# Patient Record
Sex: MALE | Race: WHITE | NOT HISPANIC OR LATINO | Employment: OTHER | URBAN - METROPOLITAN AREA
[De-identification: names, ages, dates, MRNs, and addresses within clinical notes are randomized per-mention and may not be internally consistent; named-entity substitution may affect disease eponyms.]

---

## 2019-11-01 ENCOUNTER — APPOINTMENT (OUTPATIENT)
Dept: CT IMAGING | Facility: CLINIC | Age: 84
End: 2019-11-01
Attending: STUDENT IN AN ORGANIZED HEALTH CARE EDUCATION/TRAINING PROGRAM
Payer: MEDICARE

## 2019-11-01 ENCOUNTER — HOSPITAL ENCOUNTER (OUTPATIENT)
Facility: CLINIC | Age: 84
Setting detail: OBSERVATION
Discharge: HOME | End: 2019-11-02
Attending: STUDENT IN AN ORGANIZED HEALTH CARE EDUCATION/TRAINING PROGRAM | Admitting: INTERNAL MEDICINE
Payer: MEDICARE

## 2019-11-01 ENCOUNTER — APPOINTMENT (OUTPATIENT)
Dept: MRI IMAGING | Facility: CLINIC | Age: 84
End: 2019-11-01
Attending: STUDENT IN AN ORGANIZED HEALTH CARE EDUCATION/TRAINING PROGRAM
Payer: MEDICARE

## 2019-11-01 ENCOUNTER — APPOINTMENT (OUTPATIENT)
Dept: GENERAL RADIOLOGY | Facility: CLINIC | Age: 84
End: 2019-11-01
Attending: STUDENT IN AN ORGANIZED HEALTH CARE EDUCATION/TRAINING PROGRAM
Payer: MEDICARE

## 2019-11-01 DIAGNOSIS — M54.2 NECK PAIN: ICD-10-CM

## 2019-11-01 DIAGNOSIS — Z79.01 CHRONIC ANTICOAGULATION: ICD-10-CM
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
## 2019-11-01 DIAGNOSIS — R47.9 DIFFICULTY WITH SPEECH: ICD-10-CM

## 2019-11-01 DIAGNOSIS — R79.89 ELEVATED TROPONIN: ICD-10-CM

## 2019-11-01 DIAGNOSIS — R45.89 FEELING OF SADNESS: ICD-10-CM

## 2019-11-01 PROBLEM — E78.5 HYPERLIPIDEMIA: Status: ACTIVE | Noted: 2019-11-01

## 2019-11-01 PROBLEM — D69.6 THROMBOCYTOPENIA (H): Status: ACTIVE | Noted: 2018-03-02

## 2019-11-01 PROBLEM — I82.412 ACUTE DEEP VEIN THROMBOSIS (DVT) OF FEMORAL VEIN OF LEFT LOWER EXTREMITY (H): Status: ACTIVE | Noted: 2019-06-18

## 2019-11-01 PROBLEM — C61 PROSTATE CANCER (H): Status: ACTIVE | Noted: 2018-03-02

## 2019-11-01 PROBLEM — I10 ESSENTIAL HYPERTENSION: Status: ACTIVE | Noted: 2019-11-01

## 2019-11-01 PROBLEM — M75.102 ROTATOR CUFF TEAR, NON-TRAUMATIC, LEFT: Status: ACTIVE | Noted: 2018-03-02

## 2019-11-01 PROBLEM — F32.0 MILD MAJOR DEPRESSION (H): Status: ACTIVE | Noted: 2019-09-30

## 2019-11-01 PROBLEM — D09.8 CARCINOMA IN SITU OF OTHER SPECIFIED SITES: Status: ACTIVE | Noted: 2019-11-01

## 2019-11-01 LAB
ALBUMIN SERPL-MCNC: 3.3 G/DL (ref 3.4–5)
ALP SERPL-CCNC: 60 U/L (ref 40–150)
ALT SERPL W P-5'-P-CCNC: 20 U/L (ref 0–70)
ANION GAP SERPL CALCULATED.3IONS-SCNC: 9 MMOL/L (ref 3–14)
APTT PPP: 41 SEC (ref 22–37)
AST SERPL W P-5'-P-CCNC: 30 U/L (ref 0–45)
BASOPHILS # BLD AUTO: 0 10E9/L (ref 0–0.2)
BASOPHILS NFR BLD AUTO: 0.7 %
BILIRUB SERPL-MCNC: 0.4 MG/DL (ref 0.2–1.3)
BUN SERPL-MCNC: 17 MG/DL (ref 7–30)
CALCIUM SERPL-MCNC: 8.1 MG/DL (ref 8.5–10.1)
CHLORIDE SERPL-SCNC: 111 MMOL/L (ref 94–109)
CO2 SERPL-SCNC: 24 MMOL/L (ref 20–32)
CREAT SERPL-MCNC: 1.04 MG/DL (ref 0.66–1.25)
DIFFERENTIAL METHOD BLD: ABNORMAL
EOSINOPHIL # BLD AUTO: 0.1 10E9/L (ref 0–0.7)
EOSINOPHIL NFR BLD AUTO: 1.7 %
ERYTHROCYTE [DISTWIDTH] IN BLOOD BY AUTOMATED COUNT: 12.5 % (ref 10–15)
GFR SERPL CREATININE-BSD FRML MDRD: 64 ML/MIN/{1.73_M2}
GLUCOSE BLDC GLUCOMTR-MCNC: 124 MG/DL (ref 70–99)
GLUCOSE SERPL-MCNC: 129 MG/DL (ref 70–99)
HCT VFR BLD AUTO: 41.4 % (ref 40–53)
HGB BLD-MCNC: 13.5 G/DL (ref 13.3–17.7)
IMM GRANULOCYTES # BLD: 0 10E9/L (ref 0–0.4)
IMM GRANULOCYTES NFR BLD: 0.6 %
INR PPP: 2.51 (ref 0.86–1.14)
LYMPHOCYTES # BLD AUTO: 1 10E9/L (ref 0.8–5.3)
LYMPHOCYTES NFR BLD AUTO: 17.8 %
MCH RBC QN AUTO: 30.6 PG (ref 26.5–33)
MCHC RBC AUTO-ENTMCNC: 32.6 G/DL (ref 31.5–36.5)
MCV RBC AUTO: 94 FL (ref 78–100)
MONOCYTES # BLD AUTO: 0.4 10E9/L (ref 0–1.3)
MONOCYTES NFR BLD AUTO: 7.9 %
NEUTROPHILS # BLD AUTO: 3.9 10E9/L (ref 1.6–8.3)
NEUTROPHILS NFR BLD AUTO: 71.3 %
NRBC # BLD AUTO: 0 10*3/UL
NRBC BLD AUTO-RTO: 0 /100
PLATELET # BLD AUTO: 144 10E9/L (ref 150–450)
POTASSIUM SERPL-SCNC: 3.6 MMOL/L (ref 3.4–5.3)
PROT SERPL-MCNC: 6.8 G/DL (ref 6.8–8.8)
RBC # BLD AUTO: 4.41 10E12/L (ref 4.4–5.9)
SODIUM SERPL-SCNC: 144 MMOL/L (ref 133–144)
TROPONIN I SERPL-MCNC: 0.05 UG/L (ref 0–0.04)
WBC # BLD AUTO: 5.4 10E9/L (ref 4–11)

## 2019-11-01 PROCEDURE — 25000132 ZZH RX MED GY IP 250 OP 250 PS 637: Mod: GY | Performed by: STUDENT IN AN ORGANIZED HEALTH CARE EDUCATION/TRAINING PROGRAM

## 2019-11-01 PROCEDURE — 70498 CT ANGIOGRAPHY NECK: CPT

## 2019-11-01 PROCEDURE — 25000132 ZZH RX MED GY IP 250 OP 250 PS 637: Mod: GY | Performed by: PHYSICIAN ASSISTANT

## 2019-11-01 PROCEDURE — 25000125 ZZHC RX 250: Performed by: STUDENT IN AN ORGANIZED HEALTH CARE EDUCATION/TRAINING PROGRAM

## 2019-11-01 PROCEDURE — A9585 GADOBUTROL INJECTION: HCPCS | Performed by: STUDENT IN AN ORGANIZED HEALTH CARE EDUCATION/TRAINING PROGRAM

## 2019-11-01 PROCEDURE — 70450 CT HEAD/BRAIN W/O DYE: CPT

## 2019-11-01 PROCEDURE — 25500064 ZZH RX 255 OP 636: Performed by: STUDENT IN AN ORGANIZED HEALTH CARE EDUCATION/TRAINING PROGRAM

## 2019-11-01 PROCEDURE — 93010 ELECTROCARDIOGRAM REPORT: CPT | Mod: Z6 | Performed by: STUDENT IN AN ORGANIZED HEALTH CARE EDUCATION/TRAINING PROGRAM

## 2019-11-01 PROCEDURE — 85025 COMPLETE CBC W/AUTO DIFF WBC: CPT | Performed by: STUDENT IN AN ORGANIZED HEALTH CARE EDUCATION/TRAINING PROGRAM

## 2019-11-01 PROCEDURE — 70553 MRI BRAIN STEM W/O & W/DYE: CPT

## 2019-11-01 PROCEDURE — 72125 CT NECK SPINE W/O DYE: CPT

## 2019-11-01 PROCEDURE — 00000146 ZZHCL STATISTIC GLUCOSE BY METER IP

## 2019-11-01 PROCEDURE — 84484 ASSAY OF TROPONIN QUANT: CPT | Performed by: STUDENT IN AN ORGANIZED HEALTH CARE EDUCATION/TRAINING PROGRAM

## 2019-11-01 PROCEDURE — 85610 PROTHROMBIN TIME: CPT | Performed by: STUDENT IN AN ORGANIZED HEALTH CARE EDUCATION/TRAINING PROGRAM

## 2019-11-01 PROCEDURE — 80053 COMPREHEN METABOLIC PANEL: CPT | Performed by: STUDENT IN AN ORGANIZED HEALTH CARE EDUCATION/TRAINING PROGRAM

## 2019-11-01 PROCEDURE — 99291 CRITICAL CARE FIRST HOUR: CPT | Mod: 25

## 2019-11-01 PROCEDURE — 71101 X-RAY EXAM UNILAT RIBS/CHEST: CPT | Mod: LT

## 2019-11-01 PROCEDURE — 25000128 H RX IP 250 OP 636: Performed by: STUDENT IN AN ORGANIZED HEALTH CARE EDUCATION/TRAINING PROGRAM

## 2019-11-01 PROCEDURE — 93005 ELECTROCARDIOGRAM TRACING: CPT

## 2019-11-01 PROCEDURE — 96360 HYDRATION IV INFUSION INIT: CPT

## 2019-11-01 PROCEDURE — G0378 HOSPITAL OBSERVATION PER HR: HCPCS

## 2019-11-01 PROCEDURE — 25800030 ZZH RX IP 258 OP 636: Performed by: STUDENT IN AN ORGANIZED HEALTH CARE EDUCATION/TRAINING PROGRAM

## 2019-11-01 PROCEDURE — 85730 THROMBOPLASTIN TIME PARTIAL: CPT | Performed by: STUDENT IN AN ORGANIZED HEALTH CARE EDUCATION/TRAINING PROGRAM

## 2019-11-01 PROCEDURE — 99285 EMERGENCY DEPT VISIT HI MDM: CPT | Mod: 25 | Performed by: STUDENT IN AN ORGANIZED HEALTH CARE EDUCATION/TRAINING PROGRAM

## 2019-11-01 RX ORDER — GADOBUTROL 604.72 MG/ML
7 INJECTION INTRAVENOUS ONCE
Status: COMPLETED | OUTPATIENT
Start: 2019-11-01 | End: 2019-11-01

## 2019-11-01 RX ORDER — ACETAMINOPHEN 325 MG/1
650 TABLET ORAL ONCE
Status: COMPLETED | OUTPATIENT
Start: 2019-11-01 | End: 2019-11-01

## 2019-11-01 RX ORDER — ALPRAZOLAM 0.25 MG
0.25 TABLET ORAL
Status: DISCONTINUED | OUTPATIENT
Start: 2019-11-01 | End: 2019-11-02 | Stop reason: HOSPADM

## 2019-11-01 RX ORDER — TRIAZOLAM 0.25 MG
0.25 TABLET ORAL
Status: DISCONTINUED | OUTPATIENT
Start: 2019-11-01 | End: 2019-11-01 | Stop reason: CLARIF

## 2019-11-01 RX ORDER — IBUPROFEN 400 MG/1
400 TABLET, FILM COATED ORAL
COMMUNITY
Start: 2018-05-29

## 2019-11-01 RX ORDER — IOPAMIDOL 755 MG/ML
70 INJECTION, SOLUTION INTRAVASCULAR ONCE
Status: COMPLETED | OUTPATIENT
Start: 2019-11-01 | End: 2019-11-01

## 2019-11-01 RX ORDER — ASPIRIN 325 MG
TABLET ORAL
COMMUNITY
Start: 2007-10-13

## 2019-11-01 RX ORDER — MIRTAZAPINE 15 MG/1
15 TABLET, FILM COATED ORAL EVERY EVENING
COMMUNITY
Start: 2019-09-30 | End: 2020-09-29

## 2019-11-01 RX ORDER — WARFARIN SODIUM 2.5 MG/1
TABLET ORAL
COMMUNITY
Start: 2019-10-18

## 2019-11-01 RX ORDER — TRIAZOLAM 0.25 MG
0.25 TABLET ORAL
COMMUNITY
Start: 2012-09-10

## 2019-11-01 RX ORDER — VARDENAFIL HYDROCHLORIDE 20 MG/1
20 TABLET ORAL
COMMUNITY
Start: 2012-06-18

## 2019-11-01 RX ADMIN — SODIUM CHLORIDE 500 ML: 9 INJECTION, SOLUTION INTRAVENOUS at 18:18

## 2019-11-01 RX ADMIN — ACETAMINOPHEN 650 MG: 325 TABLET, FILM COATED ORAL at 18:17

## 2019-11-01 RX ADMIN — IOPAMIDOL 70 ML: 755 INJECTION, SOLUTION INTRAVENOUS at 18:02

## 2019-11-01 RX ADMIN — ALPRAZOLAM 0.25 MG: 0.25 TABLET ORAL at 22:41

## 2019-11-01 RX ADMIN — GADOBUTROL 7 ML: 604.72 INJECTION INTRAVENOUS at 20:29

## 2019-11-01 RX ADMIN — SODIUM CHLORIDE 100 ML: 9 INJECTION, SOLUTION INTRAVENOUS at 18:02

## 2019-11-01 NOTE — ED PROVIDER NOTES
"  History     Chief Complaint   Patient presents with     Fall     Aphasia     HPI  Juan Miguel Hawkins is a 88 year old male with past medical history which includes insomnia, essential hypertension, hyperlipidemia, chronic anticoagulation since identifying left lower extremity DVT 6/18/2019 who presents with son for evaluation of difficulties with speaking earlier today.  The patient got out of bed last night around 11 PM to use the bathroom but tripped over a chair and at the left lateral aspect of his neck struck the edge of chair.  He is uncertain whether he lost consciousness, fall was not witnessed.  He eventually returned to bed and slept throughout the night without difficulties.  Ambulatory today feeling relatively well, however his son reports receiving a call from a concerned friend that while having lunch with the patient between 130-4:30 PM he was having speaking.  The patient describes the symptoms as her difficulties \"can spitting the words out\" but feels as though he was thinking clearly.  In the department he complains of some mild achy left lateral neck pain but denies headache, dizziness, visual changes from baseline,    Allergies:  Allergies   Allergen Reactions     Morphine Nausea and Vomiting     Other reaction(s): Gastrointestinal       Problem List:    Patient Active Problem List    Diagnosis Date Noted     Essential hypertension 11/01/2019     Priority: Medium     Hyperlipidemia 11/01/2019     Priority: Medium     Mild major depression (H) 09/30/2019     Priority: Medium     Acute deep vein thrombosis (DVT) of femoral vein of left lower extremity (H) 06/18/2019     Priority: Medium     Prostate cancer (H) 03/02/2018     Priority: Medium     Bx 2015 -low risk PROLARIS test, watchful waiting, elevated PSAs       Rotator cuff tear, non-traumatic, left 03/02/2018     Priority: Medium     TCO consult 11/2017 - conservative management       Thrombocytopenia (H) 03/02/2018     Priority: Medium     " Benign prostatic hyperplasia with lower urinary tract symptoms 02/13/2015     Priority: Medium     Increased prostate specific antigen (PSA) velocity 02/13/2015     Priority: Medium     Insomnia 02/13/2015     Priority: Medium     Personal history of colon cancer 02/13/2015     Priority: Medium        Past Medical History:    No past medical history on file.    Past Surgical History:    No past surgical history on file.    Family History:    No family history on file.    Social History:  Marital Status:    Social History     Tobacco Use     Smoking status: Not on file   Substance Use Topics     Alcohol use: Not on file     Drug use: Not on file        Medications:    aspirin (ASA) 325 MG tablet  ibuprofen (ADVIL/MOTRIN) 400 MG tablet  vardenafil (LEVITRA) 20 MG tablet  warfarin ANTICOAGULANT (COUMADIN) 2.5 MG tablet          Review of Systems  Constitutional:  Negative for fever or recent illness.  Eye:  Negative for deficits or changes from baseline.  Cardiovascular:  Negative for chest pain.  Respiratory:  Negative for cough or shortness of breath.  Gastrointestinal:  Negative for abdominal pain, nausea or vomiting.  Musculoskeletal:  Positive for left lateral neck discomfort.  Neurological: Positive for reported difficulty speaking and right-sided facial droop, resolved.  Negative for headache, dizziness, weakness or sensory deficits.    All others reviewed and are negative.      Physical Exam   BP: (!) 198/79  Pulse: 64  Heart Rate: 63  Temp: 97.6  F (36.4  C)  Resp: 20  Weight: 77.1 kg (170 lb)  SpO2: 98 %      Physical Exam  Constitutional:  Well developed, well nourished.  Appears nontoxic and in no acute distress.  Resting comfortably on the gurney.  HENT:  Normocephalic and atraumatic.  Symmetric in appearance.  Eyes:  Conjunctivae are normal.  Denies visual field deficit.  EOMI denies diplopia.  PERRLA.  Negative for nystagmus.  Neck:  Neck supple.  No step-offs or midline tenderness.  Cardiovascular:  No  cyanosis.  RRR.  No audible murmurs noted.   Respiratory:  Effort normal without sign of respiratory distress.  CTAB without diminished regions.    Gastrointestinal:  Soft nondistended abdomen.  Nontender and without guarding.  No rigidity or rebound tenderness.    Musculoskeletal:  Moves extremities spontaneously.  Neurological:  Patient is alert and oriented to place/time.  Speaking clearly without dysarthria or a aphasia.  Skin:  Skin is warm and dry.  Psychiatric:  Normal mood and affect.    [employer] Stroke Scale  Exam Interval: Baseline   Score    Level of consciousness: (0)   Alert, keenly responsive    LOC questions: (0)   Answers both questions correctly    LOC commands: (0)   Performs both tasks correctly    Best gaze: (0)   Normal    Visual: (0)   No visual loss    Facial palsy: (0)   Normal symmetrical movements    Motor arm (left): (0)   No drift    Motor arm (right): (0)   No drift    Motor leg (left): (0)   No drift    Motor leg (right): (0)   No drift    Limb ataxia: (0)   Absent    Sensory: (0)   Normal- no sensory loss    Best language: (0)   Normal- no aphasia    Dysarthria: (0)   Normal    Extinction and inattention: (0)   No abnormality        Total Score:  0       ED Course        Procedures               Critical Care time:  none         EKG Interpretation:      Interpreted by: Genaro Grier  Time reviewed: Upon completion    Symptoms at time of EKG: Asymptomatic   Rhythm: Sinus  Rate: Normal  Axis: Normal    Conduction: None atypical   ST Segments/ T Waves: No pathologic ST-elevations or T-wave abnormalities.  Q Waves: None  Comparison to prior: Similar morphology to previous     Clinical Impression: No sign of ischemia or arrhythmia              Results for orders placed or performed during the hospital encounter of 11/01/19 (from the past 24 hour(s))   Glucose by meter   Result Value Ref Range    Glucose 124 (H) 70 - 99 mg/dL   CBC with platelets differential   Result  Value Ref Range    WBC 5.4 4.0 - 11.0 10e9/L    RBC Count 4.41 4.4 - 5.9 10e12/L    Hemoglobin 13.5 13.3 - 17.7 g/dL    Hematocrit 41.4 40.0 - 53.0 %    MCV 94 78 - 100 fl    MCH 30.6 26.5 - 33.0 pg    MCHC 32.6 31.5 - 36.5 g/dL    RDW 12.5 10.0 - 15.0 %    Platelet Count 144 (L) 150 - 450 10e9/L    Diff Method Automated Method     % Neutrophils 71.3 %    % Lymphocytes 17.8 %    % Monocytes 7.9 %    % Eosinophils 1.7 %    % Basophils 0.7 %    % Immature Granulocytes 0.6 %    Nucleated RBCs 0 0 /100    Absolute Neutrophil 3.9 1.6 - 8.3 10e9/L    Absolute Lymphocytes 1.0 0.8 - 5.3 10e9/L    Absolute Monocytes 0.4 0.0 - 1.3 10e9/L    Absolute Eosinophils 0.1 0.0 - 0.7 10e9/L    Absolute Basophils 0.0 0.0 - 0.2 10e9/L    Abs Immature Granulocytes 0.0 0 - 0.4 10e9/L    Absolute Nucleated RBC 0.0    INR   Result Value Ref Range    INR 2.51 (H) 0.86 - 1.14   Partial thromboplastin time   Result Value Ref Range    PTT 41 (H) 22 - 37 sec   Troponin I   Result Value Ref Range    Troponin I ES 0.050 (H) 0.000 - 0.045 ug/L   Comprehensive metabolic panel   Result Value Ref Range    Sodium 144 133 - 144 mmol/L    Potassium 3.6 3.4 - 5.3 mmol/L    Chloride 111 (H) 94 - 109 mmol/L    Carbon Dioxide 24 20 - 32 mmol/L    Anion Gap 9 3 - 14 mmol/L    Glucose 129 (H) 70 - 99 mg/dL    Urea Nitrogen 17 7 - 30 mg/dL    Creatinine 1.04 0.66 - 1.25 mg/dL    GFR Estimate 64 >60 mL/min/[1.73_m2]    GFR Estimate If Black 74 >60 mL/min/[1.73_m2]    Calcium 8.1 (L) 8.5 - 10.1 mg/dL    Bilirubin Total 0.4 0.2 - 1.3 mg/dL    Albumin 3.3 (L) 3.4 - 5.0 g/dL    Protein Total 6.8 6.8 - 8.8 g/dL    Alkaline Phosphatase 60 40 - 150 U/L    ALT 20 0 - 70 U/L    AST 30 0 - 45 U/L   CT Head w/o Contrast    Narrative    CT SCAN OF THE HEAD WITHOUT CONTRAST   11/1/2019 6:06 PM     HISTORY: Fall two days ago on driveway, struck back of head,  anticoagulated.    TECHNIQUE: Axial images of the head and coronal reformations without  IV contrast material.  Radiation dose for this scan was reduced using  automated exposure control, adjustment of the mA and/or kV according  to patient size, or iterative reconstruction technique.    COMPARISON: None.    FINDINGS: There is some moderate cerebral atrophy. Minimal patchy  white matter changes are seen in both hemispheres without mass effect.  There is no evidence for intracranial hemorrhage, mass effect, acute  infarct, or skull fracture.      Impression    IMPRESSION: Chronic changes. No evidence for intracranial hemorrhage  or any acute process.    ELIOT LEON MD   CTA Head Neck with Contrast    [facility]    CTA HEAD AND NECK WITH CONTRAST 11/1/2019 6:11 PM     HISTORY: Fall and left-sided neck injury overnight, chronic Coumadin  therapy, word-finding difficulties today.    TECHNIQUE: Axial images were obtained through the head and neck with  intravenous contrast. 70 mL of Isovue-370 was given. Multiplanar  reconstructions were performed. 3-D reconstructions off a remote  workstation for CT angiography were also acquired. Carotid stenoses  were evaluated by comparing the caliber of the proximal internal  carotid artery to the caliber of the distal internal carotid artery.  Radiation dose for this scan was reduced using automated exposure  control, adjustment of the mA and/or kV according to patient size, or  iterative reconstruction technique.    FINDINGS:    Brachiocephalic vessels: There is some mild atherosclerotic plaque in  the proximal brachiocephalic vessels, but there is no stenosis.    Right carotid system: There is some mild to moderate calcific plaque  in the carotid bifurcation region causing 50% stenosis of the proximal  internal carotid artery. There is no evidence for dissection.    Left carotid system: There is mild-to-moderate plaque involving the  carotid bifurcation region without any significant stenosis. There is  no evidence for dissection.    Right vertebral artery: There is some moderate calcific  plaque in the  proximal portion of the right vertebral artery causing 70% stenosis.  The distal right vertebral artery is normal.    Left vertebral artery: There is some mild plaque at the origin of the  left vertebral artery, but there is no significant stenosis.    [place]: The distal internal carotid arteries show some mild  calcific plaque in the carotid siphon region without stenosis. The  basilar artery is patent. The proximal anterior, middle, and posterior  cerebral arteries are patent. There is no evidence for any large  vessel occlusion or stenosis. There is no evidence for thromboembolism  or saccular aneurysm. Incidental note is made of primary origin of the  left posterior cerebral artery off the left internal carotid artery.    Other findings: Degenerative changes are seen in the spine.      Impression    IMPRESSION:  1. Moderate calcific plaque at the origin of the right vertebral  artery causing approximately 70% stenosis.  2. Mild atherosclerotic disease involving multiple other vessels  without stenosis.  3. No evidence for thromboembolism, dissection, or aneurysm.    ELIOT LEON MD       Medications   0.9% sodium chloride BOLUS (0 mLs Intravenous Stopped 11/1/19 1909)   iopamidol (ISOVUE-370) solution 70 mL (70 mLs Intravenous Given 11/1/19 1802)   sodium chloride 0.9 % bag 500mL for CT scan flush use (100 mLs As instructed Given 11/1/19 1802)   acetaminophen (TYLENOL) tablet 650 mg (650 mg Oral Given 11/1/19 1817)   gadobutrol (GADAVIST) injection 7 mL (7 mLs Intravenous Given 11/1/19 2029)           Assessments & Plan (with Medical Decision Making)   Juan Miguel Hawkins is a 88 year old male who presents to the department by private vehicle accompanied by son for evaluation after report of difficulty speaking and possibly associated with a right-sided facial droop.  Son says that he received a call from the patient's lunch associate at around 1:30 PM, he arrived at 4:30 PM to find the  patient at baseline mental state without dysarthria or aphasia.  At time of arrival he is still without appreciable neurologic deficits, does complain of some mild left lateral muscular neck pain which he relates to the fall last night.  There was some minimal delay in calling for code evaluation but code stroke promptly called there after, spoke with stroke neurologist Dr. Pedro who has reviewed imaging and sees no sign of acute intracranial hemorrhage or critical vascular stenosis.  There remains question whether the patient may have suffered from TIA versus stroke, either way he has no active symptoms and will require hospital admission for further monitoring and management.  Consulted hospitalist SHANELL Melo who has accepted ongoing care for the patient at this time.  Temporary transition orders were placed per hospital protocol to prevent any potential delay in patient care.    The patient and son been informed of results and the recommendation for admission.  They have verbalized an understanding, all questions answered, and in agreement with the plan.  Shortly after son departed, the patient who confessed that he has been growing increasingly depressed living alone complex medical issues since the passing of his wife on 8/7/2019.  He says he has talked with his primary provider and has even followed up with a counselor/therapist but symptoms remain unchanged.  This information has been relayed to the hospital team for further investigation and management plan.      Disclaimer:  This note consists of symbols derived from keyboarding, dictation, and/or voice recognition software.  As a result, there may be errors in the script that have gone undetected.  Please consider this when interpreting information found in the chart.          I have reviewed the nursing notes.    I have reviewed the findings, diagnosis, plan and need for follow up with the patient.      New Prescriptions    No medications on file        Final diagnoses:   Difficulty with speech   Fall, initial encounter   Neck pain   Elevated troponin   Chronic anticoagulation   Feeling of sadness       11/1/2019   [facility] EMERGENCY DEPARTMENT     Genaro Grier DO  11/01/19 2050

## 2019-11-01 NOTE — ED TRIAGE NOTES
Pt here with son with concerns for stroke. Pt states he has been sleep deprived the past few days, states last night he fell after waking suddenly and tripping over chair, hit side of head/neck and left arm. Today at lunch per report pt's speech was slightly slurred and pt has trouble getting some words out, reported facial droop. No facial droop now. Pt states he took a nap and feels great now.

## 2019-11-02 VITALS
WEIGHT: 170 LBS | HEART RATE: 50 BPM | RESPIRATION RATE: 18 BRPM | OXYGEN SATURATION: 100 % | HEIGHT: 68 IN | TEMPERATURE: 97.6 F | SYSTOLIC BLOOD PRESSURE: 152 MMHG | DIASTOLIC BLOOD PRESSURE: 72 MMHG | BODY MASS INDEX: 25.76 KG/M2
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
## 2019-11-02 PROBLEM — R79.89 ELEVATED TROPONIN LEVEL: Status: ACTIVE | Noted: 2019-11-02

## 2019-11-02 PROBLEM — I65.01 STENOSIS OF RIGHT VERTEBRAL ARTERY: Status: ACTIVE | Noted: 2019-11-02

## 2019-11-02 PROBLEM — G45.9 TRANSIENT ISCHEMIC ATTACK: Status: ACTIVE | Noted: 2019-11-02

## 2019-11-02 PROBLEM — R47.01 EXPRESSIVE APHASIA: Status: ACTIVE | Noted: 2019-11-01
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
## 2019-11-02 PROBLEM — R29.810 FACIAL DROOP: Status: ACTIVE | Noted: 2019-11-02

## 2019-11-02 LAB
ANION GAP SERPL CALCULATED.3IONS-SCNC: 7 MMOL/L (ref 3–14)
BUN SERPL-MCNC: 15 MG/DL (ref 7–30)
CALCIUM SERPL-MCNC: 8 MG/DL (ref 8.5–10.1)
CHLORIDE SERPL-SCNC: 114 MMOL/L (ref 94–109)
CO2 SERPL-SCNC: 23 MMOL/L (ref 20–32)
CREAT SERPL-MCNC: 0.99 MG/DL (ref 0.66–1.25)
ERYTHROCYTE [DISTWIDTH] IN BLOOD BY AUTOMATED COUNT: 12.6 % (ref 10–15)
GFR SERPL CREATININE-BSD FRML MDRD: 67 ML/MIN/{1.73_M2}
GLUCOSE SERPL-MCNC: 85 MG/DL (ref 70–99)
HCT VFR BLD AUTO: 38.5 % (ref 40–53)
HGB BLD-MCNC: 12.6 G/DL (ref 13.3–17.7)
INR PPP: 2.53 (ref 0.86–1.14)
MCH RBC QN AUTO: 30.7 PG (ref 26.5–33)
MCHC RBC AUTO-ENTMCNC: 32.7 G/DL (ref 31.5–36.5)
MCV RBC AUTO: 94 FL (ref 78–100)
PLATELET # BLD AUTO: 123 10E9/L (ref 150–450)
POTASSIUM SERPL-SCNC: 3.7 MMOL/L (ref 3.4–5.3)
RBC # BLD AUTO: 4.11 10E12/L (ref 4.4–5.9)
SODIUM SERPL-SCNC: 144 MMOL/L (ref 133–144)
TROPONIN I SERPL-MCNC: 0.07 UG/L (ref 0–0.04)
TROPONIN I SERPL-MCNC: 0.07 UG/L (ref 0–0.04)
WBC # BLD AUTO: 3.7 10E9/L (ref 4–11)

## 2019-11-02 PROCEDURE — 84484 ASSAY OF TROPONIN QUANT: CPT | Performed by: STUDENT IN AN ORGANIZED HEALTH CARE EDUCATION/TRAINING PROGRAM

## 2019-11-02 PROCEDURE — 85610 PROTHROMBIN TIME: CPT | Performed by: STUDENT IN AN ORGANIZED HEALTH CARE EDUCATION/TRAINING PROGRAM

## 2019-11-02 PROCEDURE — 36415 COLL VENOUS BLD VENIPUNCTURE: CPT | Performed by: STUDENT IN AN ORGANIZED HEALTH CARE EDUCATION/TRAINING PROGRAM

## 2019-11-02 PROCEDURE — 99207 ZZC CDG-CODE CATEGORY CHANGED: CPT

## 2019-11-02 PROCEDURE — 85027 COMPLETE CBC AUTOMATED: CPT | Performed by: STUDENT IN AN ORGANIZED HEALTH CARE EDUCATION/TRAINING PROGRAM

## 2019-11-02 PROCEDURE — 99234 HOSP IP/OBS SM DT SF/LOW 45: CPT

## 2019-11-02 PROCEDURE — G0378 HOSPITAL OBSERVATION PER HR: HCPCS

## 2019-11-02 PROCEDURE — 80048 BASIC METABOLIC PNL TOTAL CA: CPT | Performed by: STUDENT IN AN ORGANIZED HEALTH CARE EDUCATION/TRAINING PROGRAM

## 2019-11-02 NOTE — H&P
"[facility]    History and Physical  Hospital Medicine       Date of Admission:  11/1/2019  Date of Service: 11/2/2019     Assessment & Plan   Juan Miguel Hawkins is a 88 year old male who presents on 11/1/2019 following an episode of speech difficulties and possible right facial droop the day of admission.    Principal Problem:    Transient ischemic attack, Expressive aphasia, Right facial droop - TIA is the working diagnosis to explain the patient's transient expressive aphasia and his 's observation that he may have had a right facial droop.  Work-up here has been negative for stroke or acute intracranial pathology.  CT brain showed no acute disease.  CT angiogram of the head and neck showed 70% stenosis of the right vertebral artery but otherwise all vessels were patent.  MRI brain showed no evidence of stroke.  The patient's interpretation is that he slept very poorly the night prior to his symptom onset, and that he was \"completely worn out\".  Supporting his the areas that his symptoms resolved at home after a 20-minute nap.  By the time he hit the emergency department, he was neurologically normal.  -The patient is already on warfarin for treatment of DVT, as well as aspirin.  Even if this represented a transient ischemic attack, antiplatelet and anticoagulation therapy appears to be adequate.  -We discussed keeping him here in the hospital today and tonight to observe for any recurrence of symptoms.  However, he feels entirely back to normal, and requests discharge.    Active Problems:      Fall at home - mechanism was mechanical.  He had fallen asleep in a chair, and pulled another chair in front of him so he could rest his legs on it.  When he awoke, he had forgotten about the chair in front of him, tripped over it, and fell, striking his left side.  He has some very mild, residual left lateral neck pain, and an abrasion on his left elbow.  As described above, CT brain was " negative for acute intracranial pathology.  -Patient advises me he will try to use more caution at home.      Acute deep vein thrombosis (DVT) of femoral vein of left lower extremity (H) - occurred 6/18/2019.  He has been on warfarin since.  Admission INR was 2.51.  -Continue warfarin.      Chronic thrombocytopenia (H) - there are platelet counts from 2015 showing a baseline level of 120 to 140K.  Etiology is not found in old medical records.  Platelet count this morning is within his baseline range at 123K.  -Follow platelets as an outpatient.      Stenosis of right vertebral artery - as described above, a 70% stenosis of the right vertebral artery was noted on CT angiogram 11/1/2019.  This probably has no bearing on the patient's transient neurologic symptoms of yesterday; see discussion above.      Elevated troponin level - initial troponin was 0.050.  Subsequent values were 0.068 and 0.066.  Admission EKG showed inversion of the T waves in lead III, and was otherwise unremarkable.  No comparison tracings are available.  The patient describes no preadmission or exertional chest pain.  -Continue aspirin.  -Additional work-up is probably not warranted unless the patient should develop associated symptoms, particularly chest pain.      DVT Prophylaxis: Warfarin  Code Status: Full Code    Lines: Peripheral.  Chen catheter: None.  Restraints: None.    Disposition: Will discharge home today.    Attestation: I spent 35 minutes on this admission visit today.    Bean Moscoso MD          Primary Care Physician   No Ref-Primary, Physician None    Past Medical History      Past Medical History:   Diagnosis Date     Stenosis of right vertebral artery 11/2/2019    70% per CTA 11/2/19.     Patient Active Problem List    Diagnosis Date Noted     Right facial droop 11/02/2019     Priority: Medium     Transient ischemic attack 11/02/2019     Priority: Medium     Stenosis of right vertebral artery 11/02/2019     Priority:  Medium     70% per CTA 11/2/19.       Elevated troponin level 11/02/2019     Priority: Medium     Fall at home 11/02/2019     Priority: Medium     Essential hypertension 11/01/2019     Priority: Medium     Hyperlipidemia 11/01/2019     Priority: Medium     Expressive aphasia 11/01/2019     Priority: Medium     Carcinoma in situ of other specified sites 11/01/2019     Priority: Medium     colon cancer,hx colon resection  colon cancer,hx colon resection       Mild major depression (H) 09/30/2019     Priority: Medium     Acute deep vein thrombosis (DVT) of femoral vein of left lower extremity (H) 06/18/2019     Priority: Medium     Prostate cancer (H) 03/02/2018     Priority: Medium     Bx 2015 -low risk PROLARIS test, watchful waiting, elevated PSAs       Rotator cuff tear, non-traumatic, left 03/02/2018     Priority: Medium     TCO consult 11/2017 - conservative management       Chronic thrombocytopenia (H) 03/02/2018     Priority: Medium     Benign prostatic hyperplasia with lower urinary tract symptoms 02/13/2015     Priority: Medium     Increased prostate specific antigen (PSA) velocity 02/13/2015     Priority: Medium     Insomnia 02/13/2015     Priority: Medium     Personal history of colon cancer 02/13/2015     Priority: Medium     ED (erectile dysfunction) 02/13/2015     Priority: Medium     Neck pain 11/12/2012     Priority: Medium        Past Surgical History   No past surgical history on file.     History is obtained from the patient and review of old records via the EMR.    History of Present Illness   Juan Miguel Hawkins is a 88 year old male who presents on 11/1/2019 following an episode of speech difficulties and possible right facial droop the day of admission.    Mr. Hawkins has chronic problems sleeping.  His sleep over the past few weeks has been particularly bad.  On the evening of 10/31/2019, he fell asleep unintentionally and a chair, with his feet elevated on another chair in front of him.  He awoke at  "about 2300, forgot about the chair in front of him, tripped over it, and fell, striking his left side.  He noted some left lateral neck pain, as well as an abrasion on his left elbow, but otherwise perceived no additional injury.  He got up, and resumed making a floral arrangement, which she was doing before he unintentionally fell asleep.  He then went to bed, slept, and awoke feeling normally.    At lunchtime on 11/1/2019, he was out with a colleague, who noticed that he was having word finding difficulty, which she described as \"spitting out words\" even though they did not always make sense.  The patient himself noted that he was having problems finding the \"right word\".  His colleague thought that he might of also had a mild right facial droop.  The patient was not aware of that.  At the time, he had no visual disturbance, no focal numbness.  No focal weakness, but simply felt \"worn out\" due to his bad sleep.  His lungs colleague contacted his son, who came to see him.  The patient laid down to take a quick nap before his son came over, and after that 20-minute nap, he awoke feeling \"completely normal\".  He felt that his speech was clear, and his son reported that there was no additional right facial droop.  However, because of the observation of the patient's lunch colleague, the patient presented to the [facility] emergency department for evaluation.    By the time he came to the emergency department, the ED physician noted no neurologic deficit, and no speech impairment.  Imaging with brain CT, CT angiogram of the head and neck, and brain MRI, suggested no acute disease, and specifically no acute stroke.  The patient has had no neurologic difficulties overnight.  He feels normal today.  Although he does have a bit of a halting speech pattern, the patient tells me that that is his normal.  He feels well, would like to go home today.    Review of Systems   ROS:  CONSTITUTIONAL: NEGATIVE for chills, fever, " sweats.  EYES: NEGATIVE for visual changes, eye irritation.  ENT/MOUTH: NEGATIVE for nasal congestion, postnasal drainage or sinus pressure  RESP: NEGATIVE for dyspnea, cough, wheeze, or respiratory chest pain.   CV: NEGATIVE for chest pain, palpitations, orthopnea, or lower extremity edema.  GI: NEGATIVE for difficulties swallowing, abdominal pain, diarrhea, nausea or vomiting.  : NEGATIVE for difficulties urinating.  MUSCULOSKELETAL: His left lateral neck pain this morning is very mild; he does not request analgesia.  NEGATIVE for back pain, joint pain, or joint swelling  NEURO: NEGATIVE for focal numbness or weakness, syncope, previous stroke or seizure disorder, or gait disturbance.  PSYCHIATRIC: Notable for recent depression; the patient's wife  a couple of months ago.  He is on mirtazapine and tolerating it well.      Prior to Admission Medications   Prior to Admission Medications   Prescriptions Last Dose Informant Patient Reported? Taking?   aspirin (ASA) 325 MG tablet Unknown at Unknown time  Yes No   ibuprofen (ADVIL/MOTRIN) 400 MG tablet Unknown at Unknown time  Yes No   Sig: Take 400 mg by mouth   mirtazapine (REMERON) 15 MG tablet   Yes No   Sig: Take 15 mg by mouth every evening   triazolam (HALCION) 0.25 MG tablet 10/31/2019 at 2200  Yes Yes   Sig: Take 0.25 mg by mouth nightly as needed for sleep   vardenafil (LEVITRA) 20 MG tablet Unknown at Unknown time  Yes No   Sig: Take 20 mg by mouth   warfarin ANTICOAGULANT (COUMADIN) 2.5 MG tablet 2019 at 0800  Yes Yes   Sig: Take 2 tablet (5mg) on Sun/Thurs and 1 1/2 tablets (3.75mg) all other days OR as directed by INR Nurse.      Facility-Administered Medications: None     Allergies   Allergies   Allergen Reactions     Morphine Nausea and Vomiting     Other reaction(s): Gastrointestinal       Family History    No family history on file.    Social History   Social History     Socioeconomic History     Marital status:      Spouse name: Not  "on file     Number of children: Not on file     Years of education: Not on file     Highest education level: Not on file   Occupational History     Not on file   Social Needs     Financial resource strain: Not on file     Food insecurity:     Worry: Not on file     Inability: Not on file     Transportation needs:     Medical: Not on file     Non-medical: Not on file   Tobacco Use     Smoking status: Not on file   Substance and Sexual Activity     Alcohol use: Not on file     Drug use: Not on file     Sexual activity: Not on file   Lifestyle     Physical activity:     Days per week: Not on file     Minutes per session: Not on file     Stress: Not on file   Relationships     Social connections:     Talks on phone: Not on file     Gets together: Not on file     Attends Protestant service: Not on file     Active member of club or organization: Not on file     Attends meetings of clubs or organizations: Not on file     Relationship status: Not on file     Intimate partner violence:     Fear of current or ex partner: Not on file     Emotionally abused: Not on file     Physically abused: Not on file     Forced sexual activity: Not on file   Other Topics Concern     Not on file   Social History Narrative     Not on file       Physical Exam   BP (!) 152/72 (BP Location: Left arm)   Pulse 50   Temp 97.6  F (36.4  C) (Oral)   Resp 18   Ht 1.727 m (5' 8\")   Wt 77.1 kg (170 lb)   SpO2 100%   BMI 25.85 kg/m       Weight: 170 lbs 0 oz Body mass index is 25.85 kg/m .     GENERAL: Pleasant man, seated at the edge of his bed.  He looks well.  EYES: Eyes grossly normal to inspection, extraocular movements intact  HENT: Nares patent bilaterally.  Nasal mucosa normal, no discharge.  NECK: Trachea midline, no stridor.    RESP: No accessory muscle use.  Lungs clear throughout on inspiration and expiration.  Expiration not prolonged, no wheeze.  CV: Regular rate and rhythm, non-tachycardic.  Normal S1 S2, grade I/IV early diastolic " "murmur heard at the left lower sternum, no extra sound.  He has 1+ edema of the left lower extremity, none on right.  ABDOMEN: Soft, non-tender, no guarding.  Liver and spleen not enlarged, no masses palpable.  Bowel sounds positive.  MS: No bony deformities noted.  No red or inflamed joints.  SKIN: Warm and dry, no rashes.  Small abrasion seen on left elbow.  NEURO: Alert, oriented, conversant.  Speech is fluent.  He has a mild \"halting\" pattern to his speech, which the patient says is not new.  Cranial nerves III - XII grossly intact.  No gross motor or sensory deficits.  Gait steady, symmetrical.  PSYCH: Calm, alert, conversant.  Able to articulate logical thoughts, no tangential thoughts, no hallucinations or delusions.  Affect normal.        Data   Data reviewed today:   Recent Labs   Lab 11/02/19  0616 11/02/19  0022 11/01/19  1752   WBC 3.7*  --  5.4   HGB 12.6*  --  13.5   MCV 94  --  94   *  --  144*   INR 2.53*  --  2.51*     --  144   POTASSIUM 3.7  --  3.6   CHLORIDE 114*  --  111*   CO2 23  --  24   BUN 15  --  17   CR 0.99  --  1.04   ANIONGAP 7  --  9   SONYA 8.0*  --  8.1*   GLC 85  --  129*   ALBUMIN  --   --  3.3*   PROTTOTAL  --   --  6.8   BILITOTAL  --   --  0.4   ALKPHOS  --   --  60   ALT  --   --  20   AST  --   --  30   TROPI 0.066* 0.068* 0.050*       Recent Results (from the past 24 hour(s))   CT Head w/o Contrast    Narrative    CT SCAN OF THE HEAD WITHOUT CONTRAST   11/1/2019 6:06 PM     HISTORY: Fall two days ago on driveway, struck back of head,  anticoagulated.    TECHNIQUE: Axial images of the head and coronal reformations without  IV contrast material. Radiation dose for this scan was reduced using  automated exposure control, adjustment of the mA and/or kV according  to patient size, or iterative reconstruction technique.    COMPARISON: None.    FINDINGS: There is some moderate cerebral atrophy. Minimal patchy  white matter changes are seen in both hemispheres without " mass effect.  There is no evidence for intracranial hemorrhage, mass effect, acute  infarct, or skull fracture.      Impression    IMPRESSION: Chronic changes. No evidence for intracranial hemorrhage  or any acute process.    ELIOT LEON MD   CTA Head Neck with Contrast    Narrative    CTA HEAD AND NECK WITH CONTRAST 11/1/2019 6:11 PM     HISTORY: Fall and left-sided neck injury overnight, chronic Coumadin  therapy, word-finding difficulties today.    TECHNIQUE: Axial images were obtained through the head and neck with  intravenous contrast. 70 mL of Isovue-370 was given. Multiplanar  reconstructions were performed. 3-D reconstructions off a remote  workstation for CT angiography were also acquired. Carotid stenoses  were evaluated by comparing the caliber of the proximal internal  carotid artery to the caliber of the distal internal carotid artery.  Radiation dose for this scan was reduced using automated exposure  control, adjustment of the mA and/or kV according to patient size, or  iterative reconstruction technique.    FINDINGS:    Brachiocephalic vessels: There is some mild atherosclerotic plaque in  the proximal brachiocephalic vessels, but there is no stenosis.    Right carotid system: There is some mild to moderate calcific plaque  in the carotid bifurcation region causing 50% stenosis of the proximal  internal carotid artery. There is no evidence for dissection.    Left carotid system: There is mild-to-moderate plaque involving the  carotid bifurcation region without any significant stenosis. There is  no evidence for dissection.    Right vertebral artery: There is some moderate calcific plaque in the  proximal portion of the right vertebral artery causing 70% stenosis.  The distal right vertebral artery is normal.    Left vertebral artery: There is some mild plaque at the origin of the  left vertebral artery, but there is no significant stenosis.    [employer]: The distal internal carotid arteries  show some mild  calcific plaque in the carotid siphon region without stenosis. The  basilar artery is patent. The proximal anterior, middle, and posterior  cerebral arteries are patent. There is no evidence for any large  vessel occlusion or stenosis. There is no evidence for thromboembolism  or saccular aneurysm. Incidental note is made of primary origin of the  left posterior cerebral artery off the left internal carotid artery.    Other findings: Degenerative changes are seen in the spine.      Impression    IMPRESSION:  1. Moderate calcific plaque at the origin of the right vertebral  artery causing approximately 70% stenosis.  2. Mild atherosclerotic disease involving multiple other vessels  without stenosis.  3. No evidence for thromboembolism, dissection, or aneurysm.    ELIOT LEON MD   MR Brain w/o & w Contrast    Narrative    MRI BRAIN WITHOUT AND WITH CONTRAST  11/1/2019 8:55 PM    HISTORY: Fall from standing last night, chronic anticoagulation  therapy, transient episode of speaking difficulties this afternoon.    TECHNIQUE: Multiplanar, multisequence MRI of the brain without and  with 7mL Gadavist.    COMPARISON: None.    FINDINGS: Mild to moderate cerebral atrophy is present. Minimal patchy  white matter changes are seen in both hemispheres without mass effect  or enhancement. Diffusion-weighted images are normal. There is no  evidence for intracranial hemorrhage or acute infarct. Vascular  structures are patent at the skull base.      Impression    IMPRESSION:  1. No evidence for intracranial hemorrhage, acute infarct, or any  focal mass lesions.  2. Cerebral atrophy.  3. Minimal nonspecific white matter changes which are most likely due  to some chronic small vessel ischemic disease given the patient's age.    ELIOT LEON MD   Cervical spine CT w/o contrast    Narrative    CT CERVICAL SPINE WITHOUT CONTRAST   11/1/2019 9:11 PM     HISTORY: Left-sided pain after fall.     TECHNIQUE: Axial images of  the cervical spine were obtained without  intravenous contrast. Multiplanar reformations were performed.  Radiation dose for this scan was reduced using automated exposure  control, adjustment of the mA and/or kV according to patient size, or  iterative reconstruction technique.     COMPARISON: None.    FINDINGS: Sagittal reconstructions demonstrate minimal retrolisthesis  of C3 on C4, otherwise normal posterior alignment. There is no  evidence for fracture. Moderate multilevel degenerative disc and facet  disease is present.    C1-C2: Degenerative changes. No stenosis.    C2-C3: Posterior ossified formation and facet hypertrophy is present  causing mild central canal and mild left-sided foraminal stenosis.    C3-C4: Posterior osteophyte formation and facet hypertrophy and  left-sided uncinate spurring is present causing mild central canal  stenosis and moderate left-sided foraminal stenosis.    C4-C5: Posterior osteophyte disc complex is present centrally along  with moderate facet hypertrophy and uncinate spurring. There is  secondary mild central and mild bilateral neural foraminal stenosis.    C5-C6: Posterior osteophyte formation, uncinate spurring and facet  hypertrophy is present causing moderate left-sided foraminal stenosis  and mild central canal stenosis.    C6-C7: Posterior osteophyte formation, uncinate spurring and facet  hypertrophy is present causing moderate left-sided foraminal stenosis  and mild central canal stenosis.    C7-T1: Normal.    Paraspinal soft tissues: Carotid calcifications are noted.      Impression    IMPRESSION: Degenerative changes. No evidence for fracture.    ELIOT LEON MD   Ribs XR, unilat 3 views + PA chest,  left    Narrative    RIBS AND CHEST LEFT THREE VIEWS   11/1/2019 9:22 PM     HISTORY:  Anterior and inferior pain after fall yesterday.    FINDINGS: Spine degenerative change and scoliosis.      Impression    IMPRESSION: No left rib fracture is appreciated.    ANA  MD MESHA       I personally reviewed the EKG tracing showing T wave inversion in lead III, and no other ST or T wave changes.  No comparison tracings are available.    Bean Moscoso MD

## 2019-11-02 NOTE — PHARMACY-ANTICOAGULATION SERVICE
Clinical Pharmacy - Warfarin Dosing Consult     Pharmacy has been consulted to manage this patient s warfarin therapy.  Indication: DVT/ PE Treatment  Therapy Goal: INR 2-3  [facility]  Warfarin Prior to Admission: Yes  Warfarin PTA Regimen: 5mg Sunday, Thursday; 3.75mg rest of week  Significant drug interactions: None  Recent documented change in oral intake/nutrition: Unknown    INR   Date Value Ref Range Status   11/01/2019 2.51 (H) 0.86 - 1.14 Final       Recommend warfarin 0 mg today as patient reported taking dose this AM. Pharmacy will monitor Juan Miguel Hawkins daily and order warfarin doses to achieve specified goal.      Please contact pharmacy as soon as possible if the warfarin needs to be held for a procedure or if the warfarin goals change.      Coni Wilkinson, PharmD

## 2019-11-02 NOTE — PLAN OF CARE
"Patient rested comfortably all night. No slurred speech or facial droop. Independent in room, alert and steady. Voiding without difficulty. BP (!) 153/62 (BP Location: Right arm)   Pulse 89   Temp 97.8  F (36.6  C) (Oral)   Resp 18   Ht 1.727 m (5' 8\")   Wt 77.1 kg (170 lb)   SpO2 96%   BMI 25.85 kg/m      "

## 2019-11-02 NOTE — PLAN OF CARE
"[facility] [facility] [place]    Patient admitted to room 2306 at approximately 2125 via wheel chair from emergency room. Patient was accompanied by transport tech.     Verbal SBAR report received from CORNELL Linn prior to patient arrival.     Patient ambulated to bed independently. Patient alert and oriented X 3. The patient is not having any pain. 0-10 Pain Scale: 5. Admission vital signs: Blood pressure (!) 174/72, pulse 62, temperature 97.6  F (36.4  C), temperature source Oral, resp. rate 18, height 1.727 m (5' 8\"), weight 77.1 kg (170 lb), SpO2 94 %. Patient was oriented to plan of care, call light, bed controls, tv, telephone, bathroom, and visiting hours.     Risk Assessment    The following safety risks were identified during admission: none. Yellow risk band applied: NO.     Skin Initial Assessment    This writer admitted this patient and completed a full skin assessment and Renan score in the Adult PCS flowsheet. Appropriate interventions initiated as needed.     Patient has cut on left hand and right small finger, both are covered with bandaids. Per patient he cut himself while washing a glass bottle a few days ago.       Secondary skin check completed by CORNELL Zapata.         Education    Patient has a Tad to Observation order: Yes  Observation education completed and documented: Yes      Michelle Garcia RN      "

## 2019-11-02 NOTE — ED NOTES
Red abrasion left rib area, sm abrasion left elbow removed bandaid pt put on from home, lungs are clear

## 2019-11-02 NOTE — ED NOTES
"Patient has  [place] to Observation  order. Patient has been given the Observation brochure -  What does Observation mean to me.\"  Patient has been given the opportunity to ask questions about observation status and their plan of care.      MAGALI MEYER RN    "

## 2019-11-02 NOTE — PROGRESS NOTES
Skin affirmation note    Admitting nurse completed full skin assessment, Renan score and Renan interventions. This writer agrees with the initial skin assessment findings.

## 2019-11-02 NOTE — PLAN OF CARE
[facility] DISCHARGE NOTE    Patient discharged to home at 10:15 AM via ambulation. Accompanied by staff. Discharge instructions reviewed with patient, opportunity offered to ask questions. Prescriptions - None ordered for discharge. All belongings sent with patient.    Xin Lua RN

## 2019-11-02 NOTE — DISCHARGE SUMMARY
[facility]    Discharge Summary  Hospital Medicine    Date of Admission:  11/1/2019  Date of Discharge:  11/2/2019   Discharging Provider: Bean Moscoso  Date of Service: 11/2/2019      Primary Care     No Ref-Primary, Physician  No address on file      Identification and Chief Compaint:  Juan Miguel Hawkins is a 88 year old male who presents on 11/1/2019 following an episode of speech difficulties and possible right facial droop the day of admission.     Discharge Diagnoses       Transient ischemic attack    Acute deep vein thrombosis (DVT) of femoral vein of left lower extremity (H)    Mild major depression (H)    Prostate cancer (H)    Chronic thrombocytopenia (H)    Expressive aphasia    Right facial droop    Stenosis of right vertebral artery    Elevated troponin level    Fall at home      Discharge Disposition   Discharged to home    Discharge Orders      Reason for your hospital stay    After a night of particularly bad sleep, as well as tripping and falling, you were noted at lunch the next day to have had difficulties finding the right word, and perhaps had a mild right facial droop.  Fortunately, the symptoms got better very quickly, particularly after you took your 20-minute nap.  However, you came to the emergency department, appropriately, to evaluate those symptoms.  You and I reviewed all of the negative studies that were done in the emergency department, including a CT scan of your brain which showed no acute injury or disease, and angiogram of the blood vessel supplying her brain which showed no critical narrowing or stenosis, and an MRI of the brain which showed no evidence of stroke.  This morning, you tell me that you feel well.  Your speech sounds very good to me, and I see no facial droop or other weakness.  Therefore, I agree with your plan to go home today.     Follow-up and recommended labs and tests     Follow up with primary care provider within 7 days for  hospital follow- up.  No specific follow up labs or tests are needed.     Activity    Your activity upon discharge: activity as tolerated.     Full Code     Diet    Follow this diet upon discharge: Orders Placed This Encounter      Regular Diet Adult        Discharge Medications   Current Discharge Medication List      CONTINUE these medications which have NOT CHANGED    Details   triazolam (HALCION) 0.25 MG tablet Take 0.25 mg by mouth nightly as needed for sleep      warfarin ANTICOAGULANT (COUMADIN) 2.5 MG tablet Take 2 tablet (5mg) on Sun/Thurs and 1 1/2 tablets (3.75mg) all other days OR as directed by INR Nurse.      aspirin (ASA) 325 MG tablet       ibuprofen (ADVIL/MOTRIN) 400 MG tablet Take 400 mg by mouth      mirtazapine (REMERON) 15 MG tablet Take 15 mg by mouth every evening      vardenafil (LEVITRA) 20 MG tablet Take 20 mg by mouth           Allergies   Allergies   Allergen Reactions     Morphine Nausea and Vomiting     Other reaction(s): Gastrointestinal       Consultations This Hospital Stay    PHARMACY TO DOSE WARFARIN  CARE TRANSITION RN/SW IP CONSULT    Significant Results and Procedures   Procedures    None.    Data   Results for orders placed or performed during the hospital encounter of 11/01/19   CT Head w/o Contrast    Narrative    CT SCAN OF THE HEAD WITHOUT CONTRAST   11/1/2019 6:06 PM     HISTORY: Fall two days ago on driveway, struck back of head,  anticoagulated.    TECHNIQUE: Axial images of the head and coronal reformations without  IV contrast material. Radiation dose for this scan was reduced using  automated exposure control, adjustment of the mA and/or kV according  to patient size, or iterative reconstruction technique.    COMPARISON: None.    FINDINGS: There is some moderate cerebral atrophy. Minimal patchy  white matter changes are seen in both hemispheres without mass effect.  There is no evidence for intracranial hemorrhage, mass effect, acute  infarct, or skull fracture.       Impression    IMPRESSION: Chronic changes. No evidence for intracranial hemorrhage  or any acute process.    ELIOT LEON MD   CTA Head Neck with Contrast    Narrative    CTA HEAD AND NECK WITH CONTRAST 11/1/2019 6:11 PM     HISTORY: Fall and left-sided neck injury overnight, chronic Coumadin  therapy, word-finding difficulties today.    TECHNIQUE: Axial images were obtained through the head and neck with  intravenous contrast. 70 mL of Isovue-370 was given. Multiplanar  reconstructions were performed. 3-D reconstructions off a remote  workstation for CT angiography were also acquired. Carotid stenoses  were evaluated by comparing the caliber of the proximal internal  carotid artery to the caliber of the distal internal carotid artery.  Radiation dose for this scan was reduced using automated exposure  control, adjustment of the mA and/or kV according to patient size, or  iterative reconstruction technique.    FINDINGS:    Brachiocephalic vessels: There is some mild atherosclerotic plaque in  the proximal brachiocephalic vessels, but there is no stenosis.    Right carotid system: There is some mild to moderate calcific plaque  in the carotid bifurcation region causing 50% stenosis of the proximal  internal carotid artery. There is no evidence for dissection.    Left carotid system: There is mild-to-moderate plaque involving the  carotid bifurcation region without any significant stenosis. There is  no evidence for dissection.    Right vertebral artery: There is some moderate calcific plaque in the  proximal portion of the right vertebral artery causing 70% stenosis.  The distal right vertebral artery is normal.    Left vertebral artery: There is some mild plaque at the origin of the  left vertebral artery, but there is no significant stenosis.    [place] of [place]: The distal internal carotid arteries show some mild  calcific plaque in the carotid siphon region without stenosis. The  basilar artery is patent. The  proximal anterior, middle, and posterior  cerebral arteries are patent. There is no evidence for any large  vessel occlusion or stenosis. There is no evidence for thromboembolism  or saccular aneurysm. Incidental note is made of primary origin of the  left posterior cerebral artery off the left internal carotid artery.    Other findings: Degenerative changes are seen in the spine.      Impression    IMPRESSION:  1. Moderate calcific plaque at the origin of the right vertebral  artery causing approximately 70% stenosis.  2. Mild atherosclerotic disease involving multiple other vessels  without stenosis.  3. No evidence for thromboembolism, dissection, or aneurysm.    ELIOT LEON MD   MR Brain w/o & w Contrast    Narrative    MRI BRAIN WITHOUT AND WITH CONTRAST  11/1/2019 8:55 PM    HISTORY: Fall from standing last night, chronic anticoagulation  therapy, transient episode of speaking difficulties this afternoon.    TECHNIQUE: Multiplanar, multisequence MRI of the brain without and  with 7mL Gadavist.    COMPARISON: None.    FINDINGS: Mild to moderate cerebral atrophy is present. Minimal patchy  white matter changes are seen in both hemispheres without mass effect  or enhancement. Diffusion-weighted images are normal. There is no  evidence for intracranial hemorrhage or acute infarct. Vascular  structures are patent at the skull base.      Impression    IMPRESSION:  1. No evidence for intracranial hemorrhage, acute infarct, or any  focal mass lesions.  2. Cerebral atrophy.  3. Minimal nonspecific white matter changes which are most likely due  to some chronic small vessel ischemic disease given the patient's age.    ELIOT LEON MD   Ribs XR, unilat 3 views + PA chest,  left    Narrative    RIBS AND CHEST LEFT THREE VIEWS   11/1/2019 9:22 PM     HISTORY:  Anterior and inferior pain after fall yesterday.    FINDINGS: Spine degenerative change and scoliosis.      Impression    IMPRESSION: No left rib fracture is  appreciated.    ANA ZIMMER MD   Cervical spine CT w/o contrast    Narrative    CT CERVICAL SPINE WITHOUT CONTRAST   11/1/2019 9:11 PM     HISTORY: Left-sided pain after fall.     TECHNIQUE: Axial images of the cervical spine were obtained without  intravenous contrast. Multiplanar reformations were performed.  Radiation dose for this scan was reduced using automated exposure  control, adjustment of the mA and/or kV according to patient size, or  iterative reconstruction technique.     COMPARISON: None.    FINDINGS: Sagittal reconstructions demonstrate minimal retrolisthesis  of C3 on C4, otherwise normal posterior alignment. There is no  evidence for fracture. Moderate multilevel degenerative disc and facet  disease is present.    C1-C2: Degenerative changes. No stenosis.    C2-C3: Posterior ossified formation and facet hypertrophy is present  causing mild central canal and mild left-sided foraminal stenosis.    C3-C4: Posterior osteophyte formation and facet hypertrophy and  left-sided uncinate spurring is present causing mild central canal  stenosis and moderate left-sided foraminal stenosis.    C4-C5: Posterior osteophyte disc complex is present centrally along  with moderate facet hypertrophy and uncinate spurring. There is  secondary mild central and mild bilateral neural foraminal stenosis.    C5-C6: Posterior osteophyte formation, uncinate spurring and facet  hypertrophy is present causing moderate left-sided foraminal stenosis  and mild central canal stenosis.    C6-C7: Posterior osteophyte formation, uncinate spurring and facet  hypertrophy is present causing moderate left-sided foraminal stenosis  and mild central canal stenosis.    C7-T1: Normal.    Paraspinal soft tissues: Carotid calcifications are noted.      Impression    IMPRESSION: Degenerative changes. No evidence for fracture.    ELIOT LEON MD       History of Present Illness   (From H&P) Juan Miguel Hawkins is a 88 year old male who presents on  "11/1/2019 following an episode of speech difficulties and possible right facial droop the day of admission.     Mr. Hawkins has chronic problems sleeping.  His sleep over the past few weeks has been particularly bad.  On the evening of 10/31/2019, he fell asleep unintentionally and a chair, with his feet elevated on another chair in front of him.  He awoke at about 2300, forgot about the chair in front of him, tripped over it, and fell, striking his left side.  He noted some left lateral neck pain, as well as an abrasion on his left elbow, but otherwise perceived no additional injury.  He got up, and resumed making a floral arrangement, which she was doing before he unintentionally fell asleep.  He then went to bed, slept, and awoke feeling normally.     At lunchtime on 11/1/2019, he was out with a colleague, who noticed that he was having word finding difficulty, which she described as \"spitting out words\" even though they did not always make sense.  The patient himself noted that he was having problems finding the \"right word\".  His colleague thought that he might of also had a mild right facial droop.  The patient was not aware of that.  At the time, he had no visual disturbance, no focal numbness.  No focal weakness, but simply felt \"worn out\" due to his bad sleep.  His lungs colleague contacted his son, who came to see him.  The patient laid down to take a quick nap before his son came over, and after that 20-minute nap, he awoke feeling \"completely normal\".  He felt that his speech was clear, and his son reported that there was no additional right facial droop.  However, because of the observation of the patient's lunch colleague, the patient presented to the [facility] emergency department for evaluation.     By the time he came to the emergency department, the ED physician noted no neurologic deficit, and no speech impairment.  Imaging with brain CT, CT angiogram of the head and neck, and brain MRI, " "suggested no acute disease, and specifically no acute stroke.  The patient has had no neurologic difficulties overnight.  He feels normal today.  Although he does have a bit of a halting speech pattern, the patient tells me that that is his normal.  He feels well, would like to go home today.        Hospital Course   Juan Miguel Hawkins was admitted on 11/1/2019.  The following problems were addressed during his hospitalization:    Principal Problem:    Transient ischemic attack, Expressive aphasia, Right facial droop - TIA is the working diagnosis to explain the patient's transient expressive aphasia and his 's observation that he may have had a right facial droop.  Work-up here has been negative for stroke or acute intracranial pathology.  CT brain showed no acute disease.  CT angiogram of the head and neck showed 70% stenosis of the right vertebral artery but otherwise all vessels were patent.  MRI brain showed no evidence of stroke.  The patient's interpretation is that he slept very poorly the night prior to his symptom onset, and that he was \"completely worn out\".  Supporting his the areas that his symptoms resolved at home after a 20-minute nap.  By the time he hit the emergency department, he was neurologically normal.  -The patient is already on warfarin for treatment of DVT, as well as aspirin.  Even if this represented a transient ischemic attack, antiplatelet and anticoagulation therapy appears to be adequate.  -We discussed keeping him here in the hospital today and tonight to observe for any recurrence of symptoms.  However, he feels entirely back to normal, and requests discharge.  Will send him home today.     Active Problems:       Fall at home - mechanism was mechanical.  He had fallen asleep in a chair, and pulled another chair in front of him so he could rest his legs on it.  When he awoke, he had forgotten about the chair in front of him, tripped over it, and fell, striking his left side.  " He has some very mild, residual left lateral neck pain, and an abrasion on his left elbow.  As described above, CT brain was negative for acute intracranial pathology.  -Patient advises me he will try to use more caution at home.       Acute deep vein thrombosis (DVT) of femoral vein of left lower extremity (H) - occurred 6/18/2019.  He has been on warfarin since.  Admission INR was 2.51.  -Continue warfarin.       Chronic thrombocytopenia (H) - there are platelet counts from 2015 showing a baseline level of 120 to 140K.  Etiology is not found in old medical records.  Platelet count this morning is within his baseline range at 123K.  -Follow platelets as an outpatient.       Stenosis of right vertebral artery - as described above, a 70% stenosis of the right vertebral artery was noted on CT angiogram 11/1/2019.  This probably has no bearing on the patient's transient neurologic symptoms of yesterday; see discussion above.       Elevated troponin level - initial troponin was 0.050.  Subsequent values were 0.068 and 0.066.  Admission EKG showed inversion of the T waves in lead III, and was otherwise unremarkable.  No comparison tracings are available.  The patient describes no preadmission or exertional chest pain.  -Continue aspirin.  -Additional work-up is probably not warranted unless the patient should develop associated symptoms, particularly chest pain.    Pending Results   Unresulted Labs Ordered in the Past 30 Days of this Admission     No orders found for last 31 day(s).          Physical Exam   Temp:  [97.6  F (36.4  C)-98.3  F (36.8  C)] 97.6  F (36.4  C)  Pulse:  [50-89] 50  Heart Rate:  [53-71] 53  Resp:  [9-20] 18  BP: (143-198)/(60-84) 152/72  SpO2:  [90 %-100 %] 100 %  Vitals:    11/01/19 1743   Weight: 77.1 kg (170 lb)     GENERAL: Pleasant man, seated at the edge of his bed.  He looks well.  EYES: Eyes grossly normal to inspection, extraocular movements intact  HENT: Nares patent bilaterally.  Nasal  "mucosa normal, no discharge.  NECK: Trachea midline, no stridor.    RESP: No accessory muscle use.  Lungs clear throughout on inspiration and expiration.  Expiration not prolonged, no wheeze.  CV: Regular rate and rhythm, non-tachycardic.  Normal S1 S2, grade I/IV early diastolic murmur heard at the left lower sternum, no extra sound.  He has 1+ edema of the left lower extremity, none on right.  ABDOMEN: Soft, non-tender, no guarding.  Liver and spleen not enlarged, no masses palpable.  Bowel sounds positive.  MS: No bony deformities noted.  No red or inflamed joints.  SKIN: Warm and dry, no rashes.  Small abrasion seen on left elbow.  NEURO: Alert, oriented, conversant.  Speech is fluent.  He has a mild \"halting\" pattern to his speech, which the patient says is not new.  Cranial nerves III - XII grossly intact.  No gross motor or sensory deficits.  Gait steady, symmetrical.  PSYCH: Calm, alert, conversant.  Able to articulate logical thoughts, no tangential thoughts, no hallucinations or delusions.  Affect normal.      The discharge plan was discussed with the patient, who expressed agreement.    Total time on this discharge was 15 minutes.    Bean Moscoso MD              "

## 2021-03-07 ENCOUNTER — HEALTH MAINTENANCE LETTER (OUTPATIENT)
Age: 86
End: 2021-03-07

## 2021-10-10 ENCOUNTER — HEALTH MAINTENANCE LETTER (OUTPATIENT)
Age: 86
End: 2021-10-10

## 2021-10-19 PROBLEM — F32.9 MAJOR DEPRESSION: Status: ACTIVE | Noted: 2019-09-30

## 2022-01-01 ENCOUNTER — HEALTH MAINTENANCE LETTER (OUTPATIENT)
Age: 87
End: 2022-01-01

## 2022-03-27 ENCOUNTER — HEALTH MAINTENANCE LETTER (OUTPATIENT)
Age: 87
End: 2022-03-27

## 2023-01-01 ENCOUNTER — HEALTH MAINTENANCE LETTER (OUTPATIENT)
Age: 88
End: 2023-01-01

## 2023-01-01 ENCOUNTER — HOSPITAL ENCOUNTER (INPATIENT)
Facility: CLINIC | Age: 88
LOS: 2 days | Discharge: HOSPICE | DRG: 065 | End: 2023-09-14
Attending: STUDENT IN AN ORGANIZED HEALTH CARE EDUCATION/TRAINING PROGRAM | Admitting: INTERNAL MEDICINE
Payer: COMMERCIAL

## 2023-01-01 ENCOUNTER — APPOINTMENT (OUTPATIENT)
Dept: CT IMAGING | Facility: HOSPITAL | Age: 88
End: 2023-01-01
Attending: STUDENT IN AN ORGANIZED HEALTH CARE EDUCATION/TRAINING PROGRAM
Payer: COMMERCIAL

## 2023-01-01 ENCOUNTER — MEDICAL CORRESPONDENCE (OUTPATIENT)
Dept: HEALTH INFORMATION MANAGEMENT | Facility: CLINIC | Age: 88
End: 2023-01-01

## 2023-01-01 ENCOUNTER — HOSPITAL ENCOUNTER (EMERGENCY)
Facility: CLINIC | Age: 88
Discharge: STILL ADMITTED OR ADMINISTRATIVE | DRG: 065 | End: 2023-09-12
Attending: STUDENT IN AN ORGANIZED HEALTH CARE EDUCATION/TRAINING PROGRAM
Payer: COMMERCIAL

## 2023-01-01 ENCOUNTER — HOSPITAL ENCOUNTER (INPATIENT)
Facility: CLINIC | Age: 88
LOS: 2 days | DRG: 057 | End: 2023-09-16
Attending: HOSPITALIST | Admitting: HOSPITALIST
Payer: MEDICARE

## 2023-01-01 ENCOUNTER — HOSPITAL ENCOUNTER (EMERGENCY)
Facility: HOSPITAL | Age: 88
Discharge: ANOTHER HOSPITAL | End: 2023-09-12
Attending: STUDENT IN AN ORGANIZED HEALTH CARE EDUCATION/TRAINING PROGRAM | Admitting: STUDENT IN AN ORGANIZED HEALTH CARE EDUCATION/TRAINING PROGRAM
Payer: COMMERCIAL

## 2023-01-01 ENCOUNTER — APPOINTMENT (OUTPATIENT)
Dept: INTERVENTIONAL RADIOLOGY/VASCULAR | Facility: CLINIC | Age: 88
DRG: 065 | End: 2023-01-01
Attending: NEUROLOGICAL SURGERY
Payer: COMMERCIAL

## 2023-01-01 VITALS
RESPIRATION RATE: 19 BRPM | HEART RATE: 68 BPM | OXYGEN SATURATION: 100 % | DIASTOLIC BLOOD PRESSURE: 88 MMHG | SYSTOLIC BLOOD PRESSURE: 181 MMHG

## 2023-01-01 VITALS
HEART RATE: 66 BPM | RESPIRATION RATE: 16 BRPM | SYSTOLIC BLOOD PRESSURE: 169 MMHG | DIASTOLIC BLOOD PRESSURE: 92 MMHG | OXYGEN SATURATION: 96 %

## 2023-01-01 VITALS — RESPIRATION RATE: 14 BRPM

## 2023-01-01 DIAGNOSIS — I63.412 ACUTE STROKE DUE TO EMBOLISM OF LEFT MIDDLE CEREBRAL ARTERY (H): Primary | ICD-10-CM

## 2023-01-01 LAB
ANION GAP SERPL CALCULATED.3IONS-SCNC: 11 MMOL/L (ref 7–15)
APTT PPP: 26 SECONDS (ref 22–38)
APTT PPP: 27 SECONDS (ref 22–38)
BASOPHILS # BLD AUTO: 0.1 10E3/UL (ref 0–0.2)
BASOPHILS NFR BLD AUTO: 1 %
BUN SERPL-MCNC: 18.3 MG/DL (ref 8–23)
CALCIUM SERPL-MCNC: 9.3 MG/DL (ref 8.2–9.6)
CHLORIDE SERPL-SCNC: 108 MMOL/L (ref 98–107)
CREAT SERPL-MCNC: 1.24 MG/DL (ref 0.67–1.17)
DEPRECATED HCO3 PLAS-SCNC: 21 MMOL/L (ref 22–29)
EGFRCR SERPLBLD CKD-EPI 2021: 55 ML/MIN/1.73M2
EOSINOPHIL # BLD AUTO: 0.1 10E3/UL (ref 0–0.7)
EOSINOPHIL NFR BLD AUTO: 3 %
ERYTHROCYTE [DISTWIDTH] IN BLOOD BY AUTOMATED COUNT: 12.9 % (ref 10–15)
GLUCOSE SERPL-MCNC: 107 MG/DL (ref 70–99)
HCT VFR BLD AUTO: 36.5 % (ref 40–53)
HGB BLD-MCNC: 12.1 G/DL (ref 13.3–17.7)
HOLD SPECIMEN: NORMAL
IMM GRANULOCYTES # BLD: 0 10E3/UL
IMM GRANULOCYTES NFR BLD: 0 %
INR PPP: 1.1 (ref 0.85–1.15)
INR PPP: 1.13 (ref 0.85–1.15)
LYMPHOCYTES # BLD AUTO: 1.4 10E3/UL (ref 0.8–5.3)
LYMPHOCYTES NFR BLD AUTO: 27 %
MCH RBC QN AUTO: 31.8 PG (ref 26.5–33)
MCHC RBC AUTO-ENTMCNC: 33.2 G/DL (ref 31.5–36.5)
MCV RBC AUTO: 96 FL (ref 78–100)
MONOCYTES # BLD AUTO: 0.6 10E3/UL (ref 0–1.3)
MONOCYTES NFR BLD AUTO: 11 %
NEUTROPHILS # BLD AUTO: 3.1 10E3/UL (ref 1.6–8.3)
NEUTROPHILS NFR BLD AUTO: 58 %
NRBC # BLD AUTO: 0 10E3/UL
NRBC BLD AUTO-RTO: 0 /100
PLATELET # BLD AUTO: 136 10E3/UL (ref 150–450)
POTASSIUM SERPL-SCNC: 4.2 MMOL/L (ref 3.4–5.3)
RBC # BLD AUTO: 3.8 10E6/UL (ref 4.4–5.9)
SODIUM SERPL-SCNC: 140 MMOL/L (ref 136–145)
TROPONIN T SERPL HS-MCNC: 69 NG/L
WBC # BLD AUTO: 5.3 10E3/UL (ref 4–11)

## 2023-01-01 PROCEDURE — 110N000005 HC R&B HOSPICE, ACCENT

## 2023-01-01 PROCEDURE — 99291 CRITICAL CARE FIRST HOUR: CPT | Mod: G0,GC | Performed by: STUDENT IN AN ORGANIZED HEALTH CARE EDUCATION/TRAINING PROGRAM

## 2023-01-01 PROCEDURE — 250N000009 HC RX 250: Performed by: INTERNAL MEDICINE

## 2023-01-01 PROCEDURE — 99152 MOD SED SAME PHYS/QHP 5/>YRS: CPT

## 2023-01-01 PROCEDURE — 85025 COMPLETE CBC W/AUTO DIFF WBC: CPT | Performed by: STUDENT IN AN ORGANIZED HEALTH CARE EDUCATION/TRAINING PROGRAM

## 2023-01-01 PROCEDURE — 99291 CRITICAL CARE FIRST HOUR: CPT | Mod: 25

## 2023-01-01 PROCEDURE — 250N000011 HC RX IP 250 OP 636: Performed by: HOSPITALIST

## 2023-01-01 PROCEDURE — 250N000013 HC RX MED GY IP 250 OP 250 PS 637: Performed by: INTERNAL MEDICINE

## 2023-01-01 PROCEDURE — 272N000116 HC CATH CR1

## 2023-01-01 PROCEDURE — 36224 PLACE CATH CAROTD ART: CPT | Mod: LT | Performed by: NEUROLOGICAL SURGERY

## 2023-01-01 PROCEDURE — B3141ZZ FLUOROSCOPY OF LEFT COMMON CAROTID ARTERY USING LOW OSMOLAR CONTRAST: ICD-10-PCS | Performed by: NEUROLOGICAL SURGERY

## 2023-01-01 PROCEDURE — 99232 SBSQ HOSP IP/OBS MODERATE 35: CPT | Mod: GV | Performed by: HOSPITALIST

## 2023-01-01 PROCEDURE — B31R1ZZ FLUOROSCOPY OF INTRACRANIAL ARTERIES USING LOW OSMOLAR CONTRAST: ICD-10-PCS | Performed by: NEUROLOGICAL SURGERY

## 2023-01-01 PROCEDURE — C1887 CATHETER, GUIDING: HCPCS

## 2023-01-01 PROCEDURE — 80048 BASIC METABOLIC PNL TOTAL CA: CPT | Performed by: STUDENT IN AN ORGANIZED HEALTH CARE EDUCATION/TRAINING PROGRAM

## 2023-01-01 PROCEDURE — 84484 ASSAY OF TROPONIN QUANT: CPT | Performed by: STUDENT IN AN ORGANIZED HEALTH CARE EDUCATION/TRAINING PROGRAM

## 2023-01-01 PROCEDURE — 250N000009 HC RX 250: Performed by: HOSPITALIST

## 2023-01-01 PROCEDURE — 99223 1ST HOSP IP/OBS HIGH 75: CPT | Performed by: INTERNAL MEDICINE

## 2023-01-01 PROCEDURE — 99238 HOSP IP/OBS DSCHRG MGMT 30/<: CPT | Mod: GV | Performed by: HOSPITALIST

## 2023-01-01 PROCEDURE — 85730 THROMBOPLASTIN TIME PARTIAL: CPT | Mod: 91

## 2023-01-01 PROCEDURE — B41F1ZZ FLUOROSCOPY OF RIGHT LOWER EXTREMITY ARTERIES USING LOW OSMOLAR CONTRAST: ICD-10-PCS | Performed by: NEUROLOGICAL SURGERY

## 2023-01-01 PROCEDURE — 250N000011 HC RX IP 250 OP 636: Mod: JZ | Performed by: INTERNAL MEDICINE

## 2023-01-01 PROCEDURE — 255N000002 HC RX 255 OP 636: Mod: JZ | Performed by: NEUROLOGICAL SURGERY

## 2023-01-01 PROCEDURE — 120N000001 HC R&B MED SURG/OB

## 2023-01-01 PROCEDURE — 36228 PLACE CATH INTRACRANIAL ART: CPT | Mod: LT | Performed by: NEUROLOGICAL SURGERY

## 2023-01-01 PROCEDURE — B3171ZZ FLUOROSCOPY OF LEFT INTERNAL CAROTID ARTERY USING LOW OSMOLAR CONTRAST: ICD-10-PCS | Performed by: NEUROLOGICAL SURGERY

## 2023-01-01 PROCEDURE — 85610 PROTHROMBIN TIME: CPT | Mod: 91

## 2023-01-01 PROCEDURE — 93005 ELECTROCARDIOGRAM TRACING: CPT | Performed by: STUDENT IN AN ORGANIZED HEALTH CARE EDUCATION/TRAINING PROGRAM

## 2023-01-01 PROCEDURE — 36415 COLL VENOUS BLD VENIPUNCTURE: CPT

## 2023-01-01 PROCEDURE — 250N000011 HC RX IP 250 OP 636: Mod: JZ | Performed by: STUDENT IN AN ORGANIZED HEALTH CARE EDUCATION/TRAINING PROGRAM

## 2023-01-01 PROCEDURE — 250N000013 HC RX MED GY IP 250 OP 250 PS 637: Performed by: HOSPITALIST

## 2023-01-01 PROCEDURE — 272N000192 HC ACCESSORY CR2

## 2023-01-01 PROCEDURE — C1760 CLOSURE DEV, VASC: HCPCS

## 2023-01-01 PROCEDURE — C1769 GUIDE WIRE: HCPCS

## 2023-01-01 PROCEDURE — 250N000011 HC RX IP 250 OP 636: Performed by: STUDENT IN AN ORGANIZED HEALTH CARE EDUCATION/TRAINING PROGRAM

## 2023-01-01 PROCEDURE — 99207 PR APP CREDIT; MD BILLING SHARED VISIT: CPT | Mod: GV | Performed by: HOSPITALIST

## 2023-01-01 PROCEDURE — 272N000196 HC ACCESSORY CR5

## 2023-01-01 PROCEDURE — 36415 COLL VENOUS BLD VENIPUNCTURE: CPT | Performed by: STUDENT IN AN ORGANIZED HEALTH CARE EDUCATION/TRAINING PROGRAM

## 2023-01-01 PROCEDURE — 99152 MOD SED SAME PHYS/QHP 5/>YRS: CPT | Mod: GC | Performed by: NEUROLOGICAL SURGERY

## 2023-01-01 PROCEDURE — 272N000567 HC SHEATH CR4

## 2023-01-01 PROCEDURE — 85730 THROMBOPLASTIN TIME PARTIAL: CPT | Performed by: STUDENT IN AN ORGANIZED HEALTH CARE EDUCATION/TRAINING PROGRAM

## 2023-01-01 PROCEDURE — 250N000009 HC RX 250: Performed by: STUDENT IN AN ORGANIZED HEALTH CARE EDUCATION/TRAINING PROGRAM

## 2023-01-01 PROCEDURE — 70496 CT ANGIOGRAPHY HEAD: CPT

## 2023-01-01 PROCEDURE — 85610 PROTHROMBIN TIME: CPT | Performed by: STUDENT IN AN ORGANIZED HEALTH CARE EDUCATION/TRAINING PROGRAM

## 2023-01-01 PROCEDURE — 250N000011 HC RX IP 250 OP 636: Performed by: INTERNAL MEDICINE

## 2023-01-01 RX ORDER — ACETAMINOPHEN 325 MG/10.15ML
650 LIQUID ORAL EVERY 6 HOURS PRN
Status: DISCONTINUED | OUTPATIENT
Start: 2023-01-01 | End: 2023-01-01 | Stop reason: HOSPADM

## 2023-01-01 RX ORDER — ONDANSETRON 4 MG/1
4 TABLET, ORALLY DISINTEGRATING ORAL EVERY 6 HOURS PRN
Status: DISCONTINUED | OUTPATIENT
Start: 2023-01-01 | End: 2023-01-01 | Stop reason: HOSPADM

## 2023-01-01 RX ORDER — HEPARIN SODIUM 200 [USP'U]/100ML
1 INJECTION, SOLUTION INTRAVENOUS CONTINUOUS PRN
Status: DISCONTINUED | OUTPATIENT
Start: 2023-01-01 | End: 2023-01-01 | Stop reason: HOSPADM

## 2023-01-01 RX ORDER — LIDOCAINE 40 MG/G
CREAM TOPICAL
Status: DISCONTINUED | OUTPATIENT
Start: 2023-01-01 | End: 2023-01-01 | Stop reason: HOSPADM

## 2023-01-01 RX ORDER — NALOXONE HYDROCHLORIDE 0.4 MG/ML
0.1 INJECTION, SOLUTION INTRAMUSCULAR; INTRAVENOUS; SUBCUTANEOUS
Status: DISCONTINUED | OUTPATIENT
Start: 2023-01-01 | End: 2023-01-01 | Stop reason: HOSPADM

## 2023-01-01 RX ORDER — PROCHLORPERAZINE MALEATE 5 MG
5 TABLET ORAL EVERY 6 HOURS PRN
Status: DISCONTINUED | OUTPATIENT
Start: 2023-01-01 | End: 2023-01-01 | Stop reason: HOSPADM

## 2023-01-01 RX ORDER — LORAZEPAM 1 MG/1
1 TABLET ORAL
Status: DISCONTINUED | OUTPATIENT
Start: 2023-01-01 | End: 2023-01-01

## 2023-01-01 RX ORDER — NALOXONE HYDROCHLORIDE 0.4 MG/ML
0.2 INJECTION, SOLUTION INTRAMUSCULAR; INTRAVENOUS; SUBCUTANEOUS
Status: CANCELLED | OUTPATIENT
Start: 2023-01-01

## 2023-01-01 RX ORDER — NALOXONE HYDROCHLORIDE 0.4 MG/ML
0.2 INJECTION, SOLUTION INTRAMUSCULAR; INTRAVENOUS; SUBCUTANEOUS
Status: DISCONTINUED | OUTPATIENT
Start: 2023-01-01 | End: 2023-01-01 | Stop reason: HOSPADM

## 2023-01-01 RX ORDER — ATROPINE SULFATE 10 MG/ML
2 SOLUTION/ DROPS OPHTHALMIC EVERY 4 HOURS PRN
Status: CANCELLED | OUTPATIENT
Start: 2023-01-01

## 2023-01-01 RX ORDER — NALOXONE HYDROCHLORIDE 0.4 MG/ML
0.4 INJECTION, SOLUTION INTRAMUSCULAR; INTRAVENOUS; SUBCUTANEOUS
Status: DISCONTINUED | OUTPATIENT
Start: 2023-01-01 | End: 2023-01-01 | Stop reason: HOSPADM

## 2023-01-01 RX ORDER — OLANZAPINE 5 MG/1
5 TABLET, ORALLY DISINTEGRATING ORAL
Status: COMPLETED | OUTPATIENT
Start: 2023-01-01 | End: 2023-01-01

## 2023-01-01 RX ORDER — LORAZEPAM 1 MG/1
1-2 TABLET ORAL
Status: CANCELLED | OUTPATIENT
Start: 2023-01-01

## 2023-01-01 RX ORDER — SALIVA STIMULANT COMB. NO.3
1 SPRAY, NON-AEROSOL (ML) MUCOUS MEMBRANE 4 TIMES DAILY
Status: DISCONTINUED | OUTPATIENT
Start: 2023-01-01 | End: 2023-01-01 | Stop reason: HOSPADM

## 2023-01-01 RX ORDER — BISACODYL 10 MG
10 SUPPOSITORY, RECTAL RECTAL
Status: CANCELLED | OUTPATIENT
Start: 2023-01-01

## 2023-01-01 RX ORDER — LORAZEPAM 2 MG/ML
1 INJECTION INTRAMUSCULAR
Status: DISCONTINUED | OUTPATIENT
Start: 2023-01-01 | End: 2023-01-01

## 2023-01-01 RX ORDER — PROCHLORPERAZINE 25 MG
12.5 SUPPOSITORY, RECTAL RECTAL EVERY 12 HOURS PRN
Status: DISCONTINUED | OUTPATIENT
Start: 2023-01-01 | End: 2023-01-01 | Stop reason: HOSPADM

## 2023-01-01 RX ORDER — MORPHINE SULFATE 2 MG/ML
1 INJECTION, SOLUTION INTRAMUSCULAR; INTRAVENOUS EVERY 30 MIN PRN
Status: DISCONTINUED | OUTPATIENT
Start: 2023-01-01 | End: 2023-01-01 | Stop reason: HOSPADM

## 2023-01-01 RX ORDER — LORAZEPAM 1 MG/1
1-2 TABLET ORAL
Status: DISCONTINUED | OUTPATIENT
Start: 2023-01-01 | End: 2023-01-01 | Stop reason: HOSPADM

## 2023-01-01 RX ORDER — AMOXICILLIN 250 MG
1 CAPSULE ORAL 2 TIMES DAILY
Status: CANCELLED | OUTPATIENT
Start: 2023-01-01

## 2023-01-01 RX ORDER — CALCIUM CARBONATE 500 MG/1
1000 TABLET, CHEWABLE ORAL 4 TIMES DAILY PRN
Status: DISCONTINUED | OUTPATIENT
Start: 2023-01-01 | End: 2023-01-01 | Stop reason: HOSPADM

## 2023-01-01 RX ORDER — MORPHINE SULFATE 10 MG/5ML
10 SOLUTION ORAL
Status: CANCELLED | OUTPATIENT
Start: 2023-01-01

## 2023-01-01 RX ORDER — SALIVA STIMULANT COMB. NO.3
1 SPRAY, NON-AEROSOL (ML) MUCOUS MEMBRANE 4 TIMES DAILY
Status: CANCELLED | OUTPATIENT
Start: 2023-01-01

## 2023-01-01 RX ORDER — MORPHINE SULFATE 2 MG/ML
2 INJECTION, SOLUTION INTRAMUSCULAR; INTRAVENOUS EVERY 30 MIN PRN
Status: DISCONTINUED | OUTPATIENT
Start: 2023-01-01 | End: 2023-01-01 | Stop reason: HOSPADM

## 2023-01-01 RX ORDER — AMOXICILLIN 250 MG
1 CAPSULE ORAL 2 TIMES DAILY
Status: DISCONTINUED | OUTPATIENT
Start: 2023-01-01 | End: 2023-01-01 | Stop reason: HOSPADM

## 2023-01-01 RX ORDER — MINERAL OIL/HYDROPHIL PETROLAT
OINTMENT (GRAM) TOPICAL
Status: DISCONTINUED | OUTPATIENT
Start: 2023-01-01 | End: 2023-01-01 | Stop reason: HOSPADM

## 2023-01-01 RX ORDER — SODIUM CHLORIDE 9 MG/ML
INJECTION, SOLUTION INTRAVENOUS CONTINUOUS
Status: DISCONTINUED | OUTPATIENT
Start: 2023-01-01 | End: 2023-01-01 | Stop reason: HOSPADM

## 2023-01-01 RX ORDER — OXYCODONE HCL 20 MG/ML
10 CONCENTRATE, ORAL ORAL EVERY 4 HOURS
Status: DISCONTINUED | OUTPATIENT
Start: 2023-01-01 | End: 2023-01-01

## 2023-01-01 RX ORDER — FLUMAZENIL 0.1 MG/ML
0.2 INJECTION, SOLUTION INTRAVENOUS
Status: DISCONTINUED | OUTPATIENT
Start: 2023-01-01 | End: 2023-01-01 | Stop reason: HOSPADM

## 2023-01-01 RX ORDER — IOPAMIDOL 755 MG/ML
75 INJECTION, SOLUTION INTRAVASCULAR ONCE
Status: COMPLETED | OUTPATIENT
Start: 2023-01-01 | End: 2023-01-01

## 2023-01-01 RX ORDER — IOPAMIDOL 612 MG/ML
100 INJECTION, SOLUTION INTRAVASCULAR ONCE
Status: COMPLETED | OUTPATIENT
Start: 2023-01-01 | End: 2023-01-01

## 2023-01-01 RX ORDER — PROCHLORPERAZINE MALEATE 5 MG
5 TABLET ORAL EVERY 6 HOURS PRN
Status: CANCELLED | OUTPATIENT
Start: 2023-01-01

## 2023-01-01 RX ORDER — MORPHINE SULFATE 20 MG/ML
10 SOLUTION ORAL EVERY 4 HOURS
Status: DISCONTINUED | OUTPATIENT
Start: 2023-01-01 | End: 2023-01-01 | Stop reason: HOSPADM

## 2023-01-01 RX ORDER — HALOPERIDOL 5 MG/ML
1 INJECTION INTRAMUSCULAR
Status: DISCONTINUED | OUTPATIENT
Start: 2023-01-01 | End: 2023-01-01

## 2023-01-01 RX ORDER — FENTANYL CITRATE 50 UG/ML
25-50 INJECTION, SOLUTION INTRAMUSCULAR; INTRAVENOUS EVERY 5 MIN PRN
Status: DISCONTINUED | OUTPATIENT
Start: 2023-01-01 | End: 2023-01-01 | Stop reason: HOSPADM

## 2023-01-01 RX ORDER — AMOXICILLIN 250 MG
1 CAPSULE ORAL 2 TIMES DAILY
Status: DISCONTINUED | OUTPATIENT
Start: 2023-01-01 | End: 2023-01-01

## 2023-01-01 RX ORDER — PROCHLORPERAZINE 25 MG
12.5 SUPPOSITORY, RECTAL RECTAL EVERY 12 HOURS PRN
Status: CANCELLED | OUTPATIENT
Start: 2023-01-01

## 2023-01-01 RX ORDER — ONDANSETRON 2 MG/ML
4 INJECTION INTRAMUSCULAR; INTRAVENOUS EVERY 6 HOURS PRN
Status: DISCONTINUED | OUTPATIENT
Start: 2023-01-01 | End: 2023-01-01 | Stop reason: HOSPADM

## 2023-01-01 RX ORDER — MORPHINE SULFATE 10 MG/5ML
10 SOLUTION ORAL
Status: DISCONTINUED | OUTPATIENT
Start: 2023-01-01 | End: 2023-01-01 | Stop reason: HOSPADM

## 2023-01-01 RX ORDER — MINERAL OIL/HYDROPHIL PETROLAT
OINTMENT (GRAM) TOPICAL
Status: CANCELLED | OUTPATIENT
Start: 2023-01-01

## 2023-01-01 RX ORDER — LIDOCAINE 40 MG/G
CREAM TOPICAL
Status: CANCELLED | OUTPATIENT
Start: 2023-01-01

## 2023-01-01 RX ORDER — SODIUM CHLORIDE 9 MG/ML
INJECTION, SOLUTION INTRAVENOUS CONTINUOUS
Status: CANCELLED | OUTPATIENT
Start: 2023-01-01

## 2023-01-01 RX ORDER — BISACODYL 10 MG
10 SUPPOSITORY, RECTAL RECTAL
Status: DISCONTINUED | OUTPATIENT
Start: 2023-01-01 | End: 2023-01-01 | Stop reason: HOSPADM

## 2023-01-01 RX ORDER — MORPHINE SULFATE 10 MG/5ML
10 SOLUTION ORAL
Status: DISCONTINUED | OUTPATIENT
Start: 2023-01-01 | End: 2023-01-01

## 2023-01-01 RX ORDER — LORAZEPAM 2 MG/ML
1-2 INJECTION INTRAMUSCULAR
Status: CANCELLED | OUTPATIENT
Start: 2023-01-01

## 2023-01-01 RX ORDER — LORAZEPAM 2 MG/ML
1-2 INJECTION INTRAMUSCULAR EVERY 4 HOURS PRN
Status: DISCONTINUED | OUTPATIENT
Start: 2023-01-01 | End: 2023-01-01

## 2023-01-01 RX ORDER — MORPHINE SULFATE 20 MG/ML
10 SOLUTION ORAL
Status: CANCELLED | OUTPATIENT
Start: 2023-01-01

## 2023-01-01 RX ORDER — ATROPINE SULFATE 10 MG/ML
2 SOLUTION/ DROPS OPHTHALMIC EVERY 4 HOURS PRN
Status: DISCONTINUED | OUTPATIENT
Start: 2023-01-01 | End: 2023-01-01 | Stop reason: HOSPADM

## 2023-01-01 RX ORDER — ONDANSETRON 4 MG/1
4 TABLET, ORALLY DISINTEGRATING ORAL EVERY 6 HOURS PRN
Status: CANCELLED | OUTPATIENT
Start: 2023-01-01

## 2023-01-01 RX ORDER — CALCIUM CARBONATE 500 MG/1
1000 TABLET, CHEWABLE ORAL 4 TIMES DAILY PRN
Status: CANCELLED | OUTPATIENT
Start: 2023-01-01

## 2023-01-01 RX ORDER — ACETAMINOPHEN 650 MG/1
650 SUPPOSITORY RECTAL EVERY 6 HOURS PRN
Status: DISCONTINUED | OUTPATIENT
Start: 2023-01-01 | End: 2023-01-01 | Stop reason: HOSPADM

## 2023-01-01 RX ORDER — LIDOCAINE HYDROCHLORIDE 20 MG/ML
JELLY TOPICAL ONCE
Status: COMPLETED | OUTPATIENT
Start: 2023-01-01 | End: 2023-01-01

## 2023-01-01 RX ORDER — NALOXONE HYDROCHLORIDE 0.4 MG/ML
0.1 INJECTION, SOLUTION INTRAMUSCULAR; INTRAVENOUS; SUBCUTANEOUS
Status: CANCELLED | OUTPATIENT
Start: 2023-01-01

## 2023-01-01 RX ORDER — SALIVA STIMULANT COMB. NO.3
1 SPRAY, NON-AEROSOL (ML) MUCOUS MEMBRANE
Status: DISCONTINUED | OUTPATIENT
Start: 2023-01-01 | End: 2023-01-01 | Stop reason: HOSPADM

## 2023-01-01 RX ORDER — GLYCOPYRROLATE 0.2 MG/ML
0.2 INJECTION, SOLUTION INTRAMUSCULAR; INTRAVENOUS EVERY 4 HOURS PRN
Status: DISCONTINUED | OUTPATIENT
Start: 2023-01-01 | End: 2023-01-01 | Stop reason: HOSPADM

## 2023-01-01 RX ORDER — MORPHINE SULFATE 20 MG/ML
10 SOLUTION ORAL
Status: DISCONTINUED | OUTPATIENT
Start: 2023-01-01 | End: 2023-01-01 | Stop reason: HOSPADM

## 2023-01-01 RX ORDER — GLYCOPYRROLATE 0.2 MG/ML
0.2 INJECTION, SOLUTION INTRAMUSCULAR; INTRAVENOUS EVERY 4 HOURS PRN
Status: CANCELLED | OUTPATIENT
Start: 2023-01-01

## 2023-01-01 RX ORDER — MORPHINE SULFATE 20 MG/ML
5 SOLUTION ORAL
Status: DISCONTINUED | OUTPATIENT
Start: 2023-01-01 | End: 2023-01-01 | Stop reason: HOSPADM

## 2023-01-01 RX ORDER — LORAZEPAM 2 MG/ML
1-2 INJECTION INTRAMUSCULAR
Status: DISCONTINUED | OUTPATIENT
Start: 2023-01-01 | End: 2023-01-01 | Stop reason: HOSPADM

## 2023-01-01 RX ORDER — ONDANSETRON 2 MG/ML
4 INJECTION INTRAMUSCULAR; INTRAVENOUS EVERY 6 HOURS PRN
Status: CANCELLED | OUTPATIENT
Start: 2023-01-01

## 2023-01-01 RX ORDER — MORPHINE SULFATE 10 MG/5ML
5 SOLUTION ORAL
Status: DISCONTINUED | OUTPATIENT
Start: 2023-01-01 | End: 2023-01-01 | Stop reason: HOSPADM

## 2023-01-01 RX ORDER — HALOPERIDOL 5 MG/ML
2 INJECTION INTRAMUSCULAR
Status: CANCELLED | OUTPATIENT
Start: 2023-01-01

## 2023-01-01 RX ORDER — CARBOXYMETHYLCELLULOSE SODIUM 5 MG/ML
1-2 SOLUTION/ DROPS OPHTHALMIC
Status: CANCELLED | OUTPATIENT
Start: 2023-01-01

## 2023-01-01 RX ORDER — CARBOXYMETHYLCELLULOSE SODIUM 5 MG/ML
1-2 SOLUTION/ DROPS OPHTHALMIC
Status: DISCONTINUED | OUTPATIENT
Start: 2023-01-01 | End: 2023-01-01 | Stop reason: HOSPADM

## 2023-01-01 RX ORDER — ACETAMINOPHEN 325 MG/1
650 TABLET ORAL EVERY 6 HOURS PRN
Status: DISCONTINUED | OUTPATIENT
Start: 2023-01-01 | End: 2023-01-01 | Stop reason: HOSPADM

## 2023-01-01 RX ORDER — MORPHINE SULFATE 20 MG/ML
10 SOLUTION ORAL
Status: DISCONTINUED | OUTPATIENT
Start: 2023-01-01 | End: 2023-01-01

## 2023-01-01 RX ORDER — HALOPERIDOL 5 MG/ML
2 INJECTION INTRAMUSCULAR
Status: DISCONTINUED | OUTPATIENT
Start: 2023-01-01 | End: 2023-01-01 | Stop reason: HOSPADM

## 2023-01-01 RX ORDER — ONDANSETRON 2 MG/ML
4-8 INJECTION INTRAMUSCULAR; INTRAVENOUS EVERY 6 HOURS PRN
Status: DISCONTINUED | OUTPATIENT
Start: 2023-01-01 | End: 2023-01-01

## 2023-01-01 RX ORDER — HALOPERIDOL 5 MG/ML
2 INJECTION INTRAMUSCULAR
Status: DISCONTINUED | OUTPATIENT
Start: 2023-01-01 | End: 2023-01-01

## 2023-01-01 RX ADMIN — GLYCOPYRROLATE 0.2 MG: 0.2 INJECTION, SOLUTION INTRAMUSCULAR; INTRAVENOUS at 20:00

## 2023-01-01 RX ADMIN — OXYCODONE HYDROCHLORIDE 10 MG: 100 SOLUTION ORAL at 04:27

## 2023-01-01 RX ADMIN — HALOPERIDOL LACTATE 1 MG: 5 INJECTION, SOLUTION INTRAMUSCULAR at 21:41

## 2023-01-01 RX ADMIN — Medication 1 SPRAY: at 21:29

## 2023-01-01 RX ADMIN — MORPHINE SULFATE 2 MG: 2 INJECTION, SOLUTION INTRAMUSCULAR; INTRAVENOUS at 02:32

## 2023-01-01 RX ADMIN — LORAZEPAM 2 MG: 2 INJECTION INTRAMUSCULAR; INTRAVENOUS at 19:46

## 2023-01-01 RX ADMIN — MIDAZOLAM 1 MG: 1 INJECTION INTRAMUSCULAR; INTRAVENOUS at 19:26

## 2023-01-01 RX ADMIN — LORAZEPAM 2 MG: 2 INJECTION INTRAMUSCULAR; INTRAVENOUS at 15:02

## 2023-01-01 RX ADMIN — MORPHINE SULFATE 1 MG: 2 INJECTION, SOLUTION INTRAMUSCULAR; INTRAVENOUS at 20:58

## 2023-01-01 RX ADMIN — LORAZEPAM 2 MG: 2 INJECTION INTRAMUSCULAR; INTRAVENOUS at 02:33

## 2023-01-01 RX ADMIN — MORPHINE SULFATE 1 MG: 2 INJECTION, SOLUTION INTRAMUSCULAR; INTRAVENOUS at 21:29

## 2023-01-01 RX ADMIN — HALOPERIDOL LACTATE 2 MG: 5 INJECTION, SOLUTION INTRAMUSCULAR at 06:03

## 2023-01-01 RX ADMIN — MORPHINE SULFATE 10 MG: 100 SOLUTION ORAL at 20:15

## 2023-01-01 RX ADMIN — LIDOCAINE HYDROCHLORIDE 10 ML: 10 INJECTION, SOLUTION INFILTRATION; PERINEURAL at 19:54

## 2023-01-01 RX ADMIN — LIDOCAINE HYDROCHLORIDE: 20 JELLY TOPICAL at 16:35

## 2023-01-01 RX ADMIN — MORPHINE SULFATE 10 MG: 100 SOLUTION ORAL at 22:38

## 2023-01-01 RX ADMIN — MORPHINE SULFATE 2 MG: 2 INJECTION, SOLUTION INTRAMUSCULAR; INTRAVENOUS at 19:46

## 2023-01-01 RX ADMIN — OXYCODONE HYDROCHLORIDE 10 MG: 100 SOLUTION ORAL at 08:51

## 2023-01-01 RX ADMIN — GLYCOPYRROLATE 0.2 MG: 0.2 INJECTION, SOLUTION INTRAMUSCULAR; INTRAVENOUS at 06:51

## 2023-01-01 RX ADMIN — OXYCODONE HYDROCHLORIDE 10 MG: 100 SOLUTION ORAL at 12:38

## 2023-01-01 RX ADMIN — FENTANYL CITRATE 25 MCG: 50 INJECTION, SOLUTION INTRAMUSCULAR; INTRAVENOUS at 19:30

## 2023-01-01 RX ADMIN — ATROPINE SULFATE 2 DROP: 10 SOLUTION/ DROPS OPHTHALMIC at 22:10

## 2023-01-01 RX ADMIN — MORPHINE SULFATE 2 MG: 2 INJECTION, SOLUTION INTRAMUSCULAR; INTRAVENOUS at 23:15

## 2023-01-01 RX ADMIN — MORPHINE SULFATE 10 MG: 100 SOLUTION ORAL at 09:04

## 2023-01-01 RX ADMIN — LORAZEPAM 2 MG: 2 INJECTION INTRAMUSCULAR; INTRAVENOUS at 02:22

## 2023-01-01 RX ADMIN — LORAZEPAM 2 MG: 2 INJECTION INTRAMUSCULAR; INTRAVENOUS at 01:31

## 2023-01-01 RX ADMIN — ATROPINE SULFATE 2 DROP: 10 SOLUTION/ DROPS OPHTHALMIC at 08:53

## 2023-01-01 RX ADMIN — MORPHINE SULFATE 2 MG: 2 INJECTION, SOLUTION INTRAMUSCULAR; INTRAVENOUS at 16:18

## 2023-01-01 RX ADMIN — LORAZEPAM 2 MG: 2 INJECTION INTRAMUSCULAR; INTRAVENOUS at 20:00

## 2023-01-01 RX ADMIN — MORPHINE SULFATE 2 MG: 2 INJECTION, SOLUTION INTRAMUSCULAR; INTRAVENOUS at 13:15

## 2023-01-01 RX ADMIN — LORAZEPAM 2 MG: 2 INJECTION INTRAMUSCULAR; INTRAVENOUS at 23:15

## 2023-01-01 RX ADMIN — MORPHINE SULFATE 2 MG: 2 INJECTION, SOLUTION INTRAMUSCULAR; INTRAVENOUS at 01:25

## 2023-01-01 RX ADMIN — MORPHINE SULFATE 10 MG: 100 SOLUTION ORAL at 04:10

## 2023-01-01 RX ADMIN — OXYCODONE HYDROCHLORIDE 10 MG: 100 SOLUTION ORAL at 00:32

## 2023-01-01 RX ADMIN — GLYCOPYRROLATE 0.2 MG: 0.2 INJECTION, SOLUTION INTRAMUSCULAR; INTRAVENOUS at 08:48

## 2023-01-01 RX ADMIN — IOPAMIDOL 30 ML: 612 INJECTION, SOLUTION INTRAVENOUS at 20:03

## 2023-01-01 RX ADMIN — LORAZEPAM 2 MG: 2 INJECTION INTRAMUSCULAR; INTRAVENOUS at 21:14

## 2023-01-01 RX ADMIN — OXYCODONE HYDROCHLORIDE 10 MG: 100 SOLUTION ORAL at 16:39

## 2023-01-01 RX ADMIN — GLYCOPYRROLATE 0.2 MG: 0.2 INJECTION, SOLUTION INTRAMUSCULAR; INTRAVENOUS at 17:42

## 2023-01-01 RX ADMIN — GLYCOPYRROLATE 0.2 MG: 0.2 INJECTION, SOLUTION INTRAMUSCULAR; INTRAVENOUS at 14:17

## 2023-01-01 RX ADMIN — MORPHINE SULFATE 2 MG: 2 INJECTION, SOLUTION INTRAMUSCULAR; INTRAVENOUS at 01:58

## 2023-01-01 RX ADMIN — MORPHINE SULFATE 2 MG: 2 INJECTION, SOLUTION INTRAMUSCULAR; INTRAVENOUS at 20:22

## 2023-01-01 RX ADMIN — HALOPERIDOL LACTATE 2 MG: 5 INJECTION, SOLUTION INTRAMUSCULAR at 23:39

## 2023-01-01 RX ADMIN — GLYCOPYRROLATE 0.2 MG: 0.2 INJECTION, SOLUTION INTRAMUSCULAR; INTRAVENOUS at 00:11

## 2023-01-01 RX ADMIN — MORPHINE SULFATE 2 MG: 2 INJECTION, SOLUTION INTRAMUSCULAR; INTRAVENOUS at 04:23

## 2023-01-01 RX ADMIN — MORPHINE SULFATE 2 MG: 2 INJECTION, SOLUTION INTRAMUSCULAR; INTRAVENOUS at 22:36

## 2023-01-01 RX ADMIN — HALOPERIDOL LACTATE 2 MG: 5 INJECTION, SOLUTION INTRAMUSCULAR at 03:11

## 2023-01-01 RX ADMIN — OXYCODONE HYDROCHLORIDE 10 MG: 100 SOLUTION ORAL at 20:41

## 2023-01-01 RX ADMIN — MORPHINE SULFATE 2 MG: 2 INJECTION, SOLUTION INTRAMUSCULAR; INTRAVENOUS at 00:10

## 2023-01-01 RX ADMIN — LORAZEPAM 2 MG: 2 INJECTION INTRAMUSCULAR; INTRAVENOUS at 08:59

## 2023-01-01 RX ADMIN — MORPHINE SULFATE 2 MG: 2 INJECTION, SOLUTION INTRAMUSCULAR; INTRAVENOUS at 00:50

## 2023-01-01 RX ADMIN — MORPHINE SULFATE 10 MG: 100 SOLUTION ORAL at 15:04

## 2023-01-01 RX ADMIN — Medication 1 SPRAY: at 18:03

## 2023-01-01 RX ADMIN — MORPHINE SULFATE 1 MG: 2 INJECTION, SOLUTION INTRAMUSCULAR; INTRAVENOUS at 22:03

## 2023-01-01 RX ADMIN — ATROPINE SULFATE 2 DROP: 10 SOLUTION/ DROPS OPHTHALMIC at 02:25

## 2023-01-01 RX ADMIN — MORPHINE SULFATE 2 MG: 2 INJECTION, SOLUTION INTRAMUSCULAR; INTRAVENOUS at 03:47

## 2023-01-01 RX ADMIN — LORAZEPAM 2 MG: 2 INJECTION INTRAMUSCULAR; INTRAVENOUS at 16:15

## 2023-01-01 RX ADMIN — IOPAMIDOL 75 ML: 755 INJECTION, SOLUTION INTRAVENOUS at 17:44

## 2023-01-01 RX ADMIN — LORAZEPAM 2 MG: 2 INJECTION INTRAMUSCULAR; INTRAVENOUS at 10:14

## 2023-01-01 RX ADMIN — LORAZEPAM 2 MG: 2 INJECTION INTRAMUSCULAR; INTRAVENOUS at 00:13

## 2023-01-01 RX ADMIN — GLYCOPYRROLATE 0.2 MG: 0.2 INJECTION, SOLUTION INTRAMUSCULAR; INTRAVENOUS at 04:23

## 2023-01-01 RX ADMIN — OLANZAPINE 5 MG: 5 TABLET, ORALLY DISINTEGRATING ORAL at 22:23

## 2023-01-01 RX ADMIN — MORPHINE SULFATE 2 MG: 2 INJECTION, SOLUTION INTRAMUSCULAR; INTRAVENOUS at 03:09

## 2023-01-01 RX ADMIN — HALOPERIDOL LACTATE 2 MG: 5 INJECTION, SOLUTION INTRAMUSCULAR at 01:27

## 2023-01-01 RX ADMIN — MORPHINE SULFATE 10 MG: 100 SOLUTION ORAL at 00:09

## 2023-01-01 RX ADMIN — HALOPERIDOL LACTATE 1 MG: 5 INJECTION, SOLUTION INTRAMUSCULAR at 22:58

## 2023-01-01 ASSESSMENT — ACTIVITIES OF DAILY LIVING (ADL)
ADLS_ACUITY_SCORE: 47
ADLS_ACUITY_SCORE: 35
ADLS_ACUITY_SCORE: 35
ADLS_ACUITY_SCORE: 47
ADLS_ACUITY_SCORE: 43
ADLS_ACUITY_SCORE: 47
ADLS_ACUITY_SCORE: 47
ADLS_ACUITY_SCORE: 51
ADLS_ACUITY_SCORE: 47
ADLS_ACUITY_SCORE: 47
ADLS_ACUITY_SCORE: 35
ADLS_ACUITY_SCORE: 51
ADLS_ACUITY_SCORE: 47
ADLS_ACUITY_SCORE: 35
ADLS_ACUITY_SCORE: 35
ADLS_ACUITY_SCORE: 51
ADLS_ACUITY_SCORE: 47
ADLS_ACUITY_SCORE: 47
ADLS_ACUITY_SCORE: 35
ADLS_ACUITY_SCORE: 47
ADLS_ACUITY_SCORE: 35
ADLS_ACUITY_SCORE: 47
ADLS_ACUITY_SCORE: 47
ADLS_ACUITY_SCORE: 43
ADLS_ACUITY_SCORE: 47
ADLS_ACUITY_SCORE: 47
ADLS_ACUITY_SCORE: 51
ADLS_ACUITY_SCORE: 51
ADLS_ACUITY_SCORE: 47
ADLS_ACUITY_SCORE: 35
ADLS_ACUITY_SCORE: 47
ADLS_ACUITY_SCORE: 51
ADLS_ACUITY_SCORE: 47
ADLS_ACUITY_SCORE: 43
ADLS_ACUITY_SCORE: 35
ADLS_ACUITY_SCORE: 51

## 2023-09-12 PROBLEM — I63.412: Status: ACTIVE | Noted: 2023-01-01

## 2023-09-12 NOTE — PROGRESS NOTES
[facility]     Endovascular Surgical Neuroradiology Pre-Procedure Note      HPI:  Juan Miguel Hawkins is a 89 year old male with atrial fibrillation on Xarelto, presents with acute onset of right sided weakness with a left M1 occlusion.   NIHSS 18  MrS 0-1      Medical History:  Past Medical History:   Diagnosis Date    Stenosis of right vertebral artery 11/2/2019    70% per CTA 11/2/19.       Surgical History:  No past surgical history on file.    Family History:  No family history on file.    Social History:  Social History     Socioeconomic History    Marital status:      Spouse name: Not on file    Number of children: Not on file    Years of education: Not on file    Highest education level: Not on file   Occupational History    Not on file   Tobacco Use    Smoking status: Not on file    Smokeless tobacco: Not on file   Substance and Sexual Activity    Alcohol use: Not on file    Drug use: Not on file    Sexual activity: Not on file   Other Topics Concern    Not on file   Social History Narrative    Not on file     Social Determinants of Health     Financial Resource Strain: Not on file   Food Insecurity: Not on file   Transportation Needs: Not on file   Physical Activity: Not on file   Stress: Not on file   Social Connections: Not on file   Intimate Partner Violence: Not on file   Housing Stability: Not on file       Allergies:  Allergies   Allergen Reactions    Morphine Nausea and Vomiting     Other reaction(s): Gastrointestinal       Is there a contrast allergy?  No    Medications:  (Not in a hospital admission)  .    ROS:  The 5 point Review of Systems is negative other than noted in the HPI or here.     PHYSICAL EXAMINATION  Vital Signs:  B/P: 167/75,  T: Data Unavailable,  P: 67,  R: 12        Pre-procedure National Institutes of Health Stroke Scale:      Score    Level of consciousness: (2)   Not alert; repeat stim to attend strong stim/pain to move    LOC questions: (2)    Answers neither question correctly    LOC commands: (2)   Performs neither task correctly    Best gaze: (1)   Partial gaze palsy    Visual: (0)   No visual loss    Facial palsy: (0)   Normal symmetrical movements    Motor arm (left): (1)   Drift    Motor arm (right): (2)   Some effort against gravity    Motor leg (left): (1)   Drift    Motor leg (right): (2)   Some effort against gravity    Limb ataxia: (0)   Absent    Sensory: (0)   Normal- no sensory loss    Best language: (3)   Mute- global aphasia    Dysarthria: (0)   Normal    Extinction and inattention: (2)   Profound diego-inattention / extinction > one modality        Total Score:  18       LABS  (most recent Cr, BUN, GFR, PLT, INR, PTT within the past 7 days):  Recent Labs   Lab 09/12/23  1732   CR 1.24*   BUN 18.3   GFRESTIMATED 55*   *   INR 1.10   PTT 27         ASSESSMENT: left M1 segment occlusion with NIHSS 18 and mRS of 0-1.     PLAN:   Mechanical thrombectomy      PRE-PROCEDURE SEDATION ASSESSMENT     Pre-Procedure Sedation Assessment done at 1855    Expected Level:  Moderate Sedation    Indication:  Sedation is required to allow for neurointerventional procedure.    Emergent condition, unable to obtain consent. Obtained verbal consent over the phone from Son and daughter in law    PO Intake:  Emergent     ASA Class:  Class 3 - SEVERE SYSTEMIC DISEASE, DEFINITE FUNCTIONAL LIMITATIONS.    Mallampati:  Grade 2:  Soft palate, base of uvula, tonsillar pillars, and portion of posterior pharyngeal wall visible    History and physical reviewed and no updates needed. I have reviewed the lab findings, diagnostic data, medications, and the plan for sedation. I have determined this patient to be an appropriate candidate for the planned sedation and procedure and have reassessed the patient IMMEDIATELY PRIOR to sedation and procedure.    Patient was discussed with the Attending, Dr. Oseguera  , who agrees with the plan.  Theresa Arnold MD  Endovascular  Surgical Neuroradiology Fellow  [facility]  [phone]    Endovascular Surgical Neuroradiology staff is Dr. Oseguera

## 2023-09-12 NOTE — ED NOTES
Bed: [facility]-19  Expected date:   Expected time:   Means of arrival:   Comments:  Mheat[place] - 89 y M Aphasia Stroke Code

## 2023-09-12 NOTE — ED TRIAGE NOTES
Patient arrives from [facility] with stroke symptoms. Last known well time 1645. Patient was with daughter and went aphasiac, no movement in R extremities. Provider met patient at desk, tier 1 stroke code called. Patient to CT.      Per EMS patient lost pulse for about 15 seconds. HR dropped briefly to 29.      Triage Assessment       Row Name 09/12/23 4347       Triage Assessment (Adult)    Airway WDL WDL       Respiratory WDL    Respiratory WDL WDL       Skin Circulation/Temperature WDL    Skin Circulation/Temperature WDL WDL       Cardiac WDL    Cardiac WDL X;rhythm    Pulse Rate & Regularity bradycardic       Peripheral/Neurovascular WDL    Peripheral Neurovascular WDL WDL       Cognitive/Neuro/Behavioral WDL    Cognitive/Neuro/Behavioral WDL X    Level of Consciousness other (see comments)    Arousal Level arouses to vigorous stimulation    Orientation other (see comments)  DELANEY    Speech other (see comments)  none       [place] Coma Scale    Best Eye Response 3-->(E3) to speech    Best Motor Response 4-->(M4) withdraws from pain    Best Verbal Response 1-->(V1) none    Trang Coma Scale Score 8

## 2023-09-12 NOTE — ED NOTES
Patient's apple watch, car keys, and clothing given to family prior to leaving to go to [facility].

## 2023-09-12 NOTE — CONSULTS
"[facility]    Stroke Consult Note    Reason for Consult: Stroke Code     Chief Complaint: No chief complaint on file.      HPI  Juan Miguel Hawkins is a 89 year old male with h/o DVT on Xarelto presents to the ED after having sudden onset right-sided weakness at 6:45 PM when he was in the car with his wife.  He was brought to the ED and underwent CT head and CTA head and neck which was noted to have left M1 occlusion.  His NIH SS 22 and he was transferred to [facility] for thrombectomy.    Imaging Findings  CT Head- no hemorrhage noted  CTA head and neck- Left M1 occlusion noted    Intravenous Thrombolysis  Not given due to:   - DOAC dose within 48 hours or INR > 1.7    Endovascular Treatment  Endovascular treatment initiated for Left M1 occlusion    Impression   Acute ischemic stroke of Left MCA due to large-artery atherosclerosis vs Cardio embolism      Recommendations  - Transfer to [facility] for thrombectomy  - MRI Brain w/w/o contrast  - ECHO  - lipid profile,HbA1c  - Maintain SBP<220 mmHg    Patient Follow-up     - final recommendation pending work-up    Thank you for this consult. We will continue to follow.      The Stroke Staff is CLARENCE García .    Leti Rose MD  Vascular Neurology Fellow    To page me or covering stroke neurology team member, click here: AMCOM  Choose \"On Call\" tab at top, then select \"NEUROLOGY/ALL SITES\" from middle drop-down box, press Enter, then look for \"stroke\" or \"telestroke\" for your site.  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     Past Surgical History   No past surgical history on file.  Medications   Home Meds  Prior to Admission medications    Medication Sig Start Date End Date Taking? Authorizing Provider   aspirin (ASA) 325 MG tablet  10/13/07   Reported, Patient   ibuprofen (ADVIL/MOTRIN) 400 MG tablet Take 400 mg by mouth 5/29/18   Reported, Patient   mirtazapine (REMERON) 15 MG tablet Take 15 mg by mouth every evening 9/30/19 9/29/20  Reported, Patient   triazolam (HALCION) 0.25 MG tablet Take 0.25 mg by mouth nightly as needed for sleep 9/10/12   Reported, Patient   vardenafil (LEVITRA) 20 MG tablet Take 20 mg by mouth 6/18/12   Reported, Patient   warfarin ANTICOAGULANT (COUMADIN) 2.5 MG tablet Take 2 tablet (5mg) on Sun/Thurs and 1 1/2 tablets (3.75mg) all other days OR as directed by INR Nurse. 10/18/19   Reported, Patient       Scheduled Meds      Infusion Meds      PRN Meds      Allergies   Allergies   Allergen Reactions    Morphine Nausea and Vomiting     Other reaction(s): Gastrointestinal     Family History   No family history on file.  Social History        Review of Systems   Review of systems was not needed on this patient       PHYSICAL EXAMINATION        Neuro Exam  Mental Status:   awake, alert, following commands intermittently, mute, no verbal output  Cranial Nerves:  left gaze preference,   Motor:  AG in bilateral UE and LE, has more spontaneous movements on left.  Reflexes:  unable to test (telestroke)  Sensory:   withdraws to noxious stimuli on left> right  Coordination:   unable to p erform due to weakness  Station/Gait:  unable to test due to telestroke    Dysphagia Screen  Per Nursing    Stroke Scales    NIHSS  1a. Level of Consciousness 0-->Alert, keenly responsive   1b. LOC Questions 2-->Answers neither question correctly   1c. LOC Commands 2-->Performs neither task correctly   2.   Best Gaze 1-->Partial gaze palsy, gaze is abnormal in one or both  eyes, but forced deviation or total gaze paresis is not present   3.   Visual 1-->Partial hemianopia   4.   Facial Palsy 1-->Minor paralysis (flattened nasolabial fold, asymmetry on smiling)   5a. Motor Arm, Left 2-   5b. Motor Arm, Right 2-->Some effort against gravity, limb cannot get to or maintain (if cued) 90 (or 45) degrees, drifts down to bed, but has some effort against gravity   6a. Motor Leg, Left 2->   6b. Motor Leg, right 2-->Some effort against gravity, leg falls to bed by 5 secs, but has some effort against gravity   7.   Limb Ataxia 0-->Absent   8.   Sensory 1-->Mild-to-moderate sensory loss, patient feels pinprick is less sharp or is dull on the affected side, or there is a loss of superficial pain with pinprick, but patient is aware of being touched   9.   Best Language 3-->Mute, global aphasia, no usable speech or auditory comprehension   10. Dysarthria 2-->Severe dysarthria, patients speech is so slurred as to be unintelligible in the absence of or out of proportion to any dysphasia, or is mute/anarthric   11. Extinction and Inattention  1-->Visual, tactile, auditory, spatial, or personal inattention or extinction to bilateral simultaneous stimulation in one of the sensory modalities   Total 22(09/12/23 1746)       Modified [place] Score (Pre-morbid)-0    -      Imaging  I personally reviewed all imaging; relevant findings per HPI.     Lab Results Data   CBC  No results for input(s): WBC, RBC, HGB, HCT, PLT in the last 168 hours.  Basic Metabolic Panel    No results for input(s): NA, POTASSIUM, CHLORIDE, CO2, BUN, CR, GLC, SONYA in the last 168 hours.  Liver Panel  No results for input(s): PROTTOTAL, ALBUMIN, BILITOTAL, ALKPHOS, AST, ALT, BILIDIRECT in the last 168 hours.  INR    Recent Labs   Lab Test 11/02/19  0616 11/01/19  1752   INR 2.53* 2.51*      Lipid Profile  No lab results found.  A1C  No lab results found.  Troponin  No results for input(s): CTROPT, TROPONINIS, TROPONINI, GHTROP in the  last 168 hours.       Stroke Code Data Data   Stroke Code Data  (for stroke code with tele)  Stroke code activated 09/12/23   1719   First stroke provider response 09/12/23   1720   Video start time       09/12/23  1735   Video end time       09/12/23  1815   Last known normal       09/12/23 1645   Time of discovery  (or onset of symptoms)  09/12/23     1645   Head CT read by Stroke Neuro Dr/Provider       09/12/23 1750   Was stroke code de-escalated?       Transfer to [facility] for thrombectomy           Telestroke Service Details  Type of service telemedicine diagnostic assessment of acute neurological changes   Reason telemedicine is appropriate patient requires assessment with a specialist for diagnosis and treatment of neurological symptoms   Mode of transmission secure interactive audio and video communication per Carrie   Originating site (patient location) [facility]    Distant site (provider location) [facility]

## 2023-09-13 NOTE — CONSULTS
SW: Writer was consulted for hospice. GIP was consulted. SW will not see at this time. If patient does not sign onto GIP, writer will see and assist with discharge planning.    SHEA Torres

## 2023-09-13 NOTE — PROGRESS NOTES
"[facility]  Hospitalist Progress Note   09/13/2023          Assessment and Plan:       Juan Miguel Hawkins is a 89 year old male with hypertension, hyperlipidemia, mild depression, prostate cancer, chronic thrombocytopenia who was transferred to [facility] to undergo emergent thrombectomy after patient presented with acute ischemic stroke resulting from left M1 occlusion.  Considering patient wishes, family decided to change goals of care to comfort care and endovascular treatment was aborted and patient was requested to be admitted for comfort care management.     Comfort care patient.  Acute ischemic stroke secondary to M1 occlusion\"  --Patient scented with acute onset right-sided weakness, dysarthria.  PTA on Xarelto due to history of DVT. CT scan of the head and CTA head and neck and was found to have left M1 occlusion. NIH score was 22 on presentation with global aphasia, partial gaze palsy, partial hemianopia, right-sided hemiplegia and mild to moderate sensory loss on the right side.    Patient was evaluated by stroke neurology patient was not a candidate for TNK as patient was on DOAC agent. Considered to be a candidate for endovascular treatment for left M1 occlusion.      Patient was transferred to [facility] to undergo endovascular treatment.  Family had opted for comfort care respecting patient's wishes and endovascular treatment aborted  --Again had detailed discussion with family at bedside on 9/13.  Continue supportive, symptom management.  GIP hospice consult requested.   consult requested.  Continue PRN artificial saliva, atropine ophthalmic drops, sublingual Roxanol, IV and sublingual   --Ativan and Haldol as needed for agitation.  --Diet as tolerated.     Other medical conditions not addressed at this point:     History of DVT of femoral vein of left lower extremity  Benign prostate hyperplasia  Essential hypertension  Hyperlipidemia  Insomnia  Major " depression.  Personal history of colon cancer.  Prostate cancer.  Chronic thrombocytopenia.  History of TIA  History of falls at home     Orders Placed This Encounter      Pureed Diet (level 4) Liquidized/Moderately Thick (level 3)      DVT Prophylaxis: Comfort care.  Code Status: No CPR- Do NOT Intubate  Disposition: Expected discharge pending clinical course.    Discussed with patient, bedside RN  >35 minutes spent by me on the date of service doing chart review, history, exam, documentation & further activities per the note.      Shannan Cavazos MD        Interval History:        Patient lying in bed.  Drowsy, barely arousable.  Receiving as needed Roxanol.  Per report incontinent of bladder.  Family by bedside, questions answered.       Physical Exam:        Physical Exam   Pulse:  [63-82] 68  Resp:  [12-18] 14  BP: (165-205)/() 181/88  SpO2:  [95 %-100 %] 100 %  PHYSICAL EXAM  GENERAL: Patient is in no distress.  Mouth breather.  HEART: Regular rate and rhythm. S1S2. No murmurs  LUNGS: Respirations unlabored  NEURO: Somnolent.  EXTREMITIES: No pedal edema  SKIN: Warm, dry.   PSYCHIATRY somnolent.       Medications:         artificial saliva  1 spray Mouth/Throat 4x Daily    - MEDICATION INSTRUCTIONS -   Does not apply See Admin Instructions    senna-docusate  1 tablet Oral BID    sodium chloride (PF)  3 mL Intracatheter Q8H     atropine, [START ON 9/15/2023] bisacodyl, calcium carbonate, artificial tears, glycopyrrolate, haloperidol lactate, lidocaine 4%, lidocaine (buffered or not buffered), LORazepam **OR** LORazepam, melatonin, mineral oil-hydrophilic petrolatum, morphine **OR** morphine sulfate, naloxone, naloxone, ondansetron **OR** ondansetron, prochlorperazine **OR** prochlorperazine **OR** prochlorperazine, sodium chloride, sodium chloride (PF)         Data:      All new lab and imaging data was reviewed.

## 2023-09-13 NOTE — PROVIDER NOTIFICATION
MD Notification    Notified Person: MD    Notified Person Name: Dr. Fu    Notification Date/Time: 9/12/23 at 2215    Notification Interaction: vocera    Purpose of Notification: pt restless and agitated, all PRN meds given, can we get something else?    Orders Received: one time dose of zyprexa    Comments:

## 2023-09-13 NOTE — PROGRESS NOTES
We initiated emergency cerebral angiography and mechanical thrombectomy for the treatment of acute left hemispheric stroke. Although the patient's son initially consented to the procedure, he and the patient's daughter have conferred and determined that the patient's advanced directives were clear about pursuing no treatments. His quality of life has apparently been poor since his wife . He has repeatedly stated that he no longer wishes to live. The family understands that this stroke will likely be fatal in this patient. They wish for him to transition to comfort care.    I confirmed the family's directives myself. We had just catheterized the left MCA and aborted the procedure. We shall proceed with comfort care measures after discussing with the other inpatient teams.    FINA Oseguera MD

## 2023-09-13 NOTE — PROVIDER NOTIFICATION
MD Notification    Notified Person: MD    Notified Person Name: Dr. Tello     Notification Date/Time: 9/12/23 at 2325    Notification Interaction: vocera    Purpose of Notification: pt still restless after all PRN meds given    Orders Received: increase haldol to 2 mg, ok to give dose now    Comments:

## 2023-09-13 NOTE — PLAN OF CARE
Goal Outcome Evaluation:         History:   Primary Diagnosis: stroke       9/13/23 [id number]:    Orientation: unresponsive-DELANEY, aroused to pain at times, non verbal  Activity: bedrest, q2 turn and reposition per family request  Diet/BS Checks: puree diet, has not been eating, oral cares provided by family  Tele:  none  IV Access/Drains:  L PIV SL  Pain Management:  none given this shift  Abnormal VS/Results: n/a  Bowel/Bladder: incontinent, no BM, no voiding.     Skin/Wounds: Scattered bruising all over. No wound.   Consults: GIP,TC  D/C Disposition: Plan for patient to pass in the hospital.   Other Info:  GIP hospice. Comfort care measures focus. Pt resting comfortably during shift. PRN ativan x2 with improvement. Robinul given x1. Many family members at bedside and help care for patient.

## 2023-09-13 NOTE — ED PROVIDER NOTES
Emergency Department Encounter         FINAL IMPRESSION:  Stroke        ED COURSE AND MEDICAL DECISION MAKING       ED Course as of 09/13/23 0028   rosa isela Sep 12, 2023   6220 Patient is a 89-year-old with a history of DVT, A-fib, hypertension, hyperlipidemia, here via EMS as a potential stroke code.  Apparently he had sudden onset of right-sided deficits as well as dysarthria and inability to speak 30 minutes ago.  History is otherwise limited as patient cannot give any history.  Tier 1 stroke code called.  Per chart review patient is a full code however apparently daughter who was driving with him when the event started, told EMS that he was a DNR/DNI.  Unable to find formal charting other than a full code back in 2022.  Airway intact.  He did have episode of bradycardia that required a sternal rub in route.  Here vitals otherwise are stable.  We will continue to monitor.  Discussed case with stroke neuro who is on board.  Looks like he is on blood thinners, so he may not be a tenecteplase candidate.   1808 Bedside evaluation in conjunction with stroke neurology.  Patient is an M1 occlusion.  Is on thinners.  However is a candidate for thrombectomy.  Patient was sent emergently to [facility].  Discussed case with  the ED doc at [facility] who accepts.                          Medical Decision Making    History:  Supplemental history from: Documented in chart, if applicable  External Record(s) reviewed: Documented in chart, if applicable.    Work Up:  Chart documentation includes differential considered and any EKGs or imaging independently interpreted by provider, where specified.  In additional to work up documented, I considered the following work up: Documented in chart, if applicable.    External consultation:  Discussion of management with another provider: Documented in chart, if applicable    Complicating factors:  Care impacted by chronic illness: N/A  Care affected by social determinants of  health: N/A    Disposition considerations: Admit.                    Critical Care     Performed by: Guero Herr or    Authorized by: Guero Herr  Total critical care time:  60 minutes  Critical care was necessary to treat or prevent imminent or life-threatening deterioration of the following conditions: stroke  Critical care was time spent personally by me on the following activities: development of treatment plan with patient or surrogate, discussions with consultants, examination of patient, evaluation of patient's response to treatment, obtaining history from patient or surrogate, ordering and performing treatments and interventions, ordering and review of laboratory studies, ordering and review of radiographic studies, re-evaluation of patient's condition and monitoring for potential decompensation.  Critical care time was exclusive of separately billable procedures and treating other patients.'    At the conclusion of the encounter I discussed the results of all the tests and the disposition. The questions were answered. The patient or family acknowledged understanding and was agreeable with the care plan.                  MEDICATIONS GIVEN IN THE EMERGENCY DEPARTMENT:  Medications   iopamidol (ISOVUE-370) solution 75 mL (75 mLs Intravenous $Given 9/12/23 9185)       NEW PRESCRIPTIONS STARTED AT TODAY'S ED VISIT:  Discharge Medication List as of 9/12/2023  6:52 PM          HPI     89-year-old male history of hypertension, DVT on anticoagulation, here via EMS after dysarthria, altered mental status and right-sided weakness began 30 minutes ago.  History is otherwise limited because of patient's aphasia.          REVIEW OF SYSTEMS:  Review of Systems   Constitutional: Negative for fever, malaise  HEENT: Negative runny nose, sore throat, ear pain, neck pain  Respiratory: Negative for shortness of breath, cough, congestion  Cardiovascular: Negative for chest pain, leg edema  Gastrointestinal: Negative for abdominal  distention, abdominal pain, constipation, vomiting, nausea, diarrhea  Genitourinary: Negative for dysuria and hematuria.   Integument: Negative for rash, skin breakdown  Neurological: Negative for paresthesias, weakness, headache.  Musculoskeletal: Negative for joint pain, joint swelling      All other systems reviewed and are negative.          MEDICAL HISTORY     Past Medical History:   Diagnosis Date    Stenosis of right vertebral artery 11/2/2019       No past surgical history on file.         No current outpatient medications on file.          PHYSICAL EXAM     BP (!) 169/92   Pulse 66   Resp 16   SpO2 96%       PHYSICAL EXAM:     General: Patient appears well, nontoxic, comfortable  HEENT: Moist mucous membranes,  No head trauma.    Cardiovascular: Normal rate, normal rhythm, no extremity edema.  No appreciable murmur.  Respiratory: No signs of respiratory distress, lungs are clear to auscultation bilaterally with no wheezes rhonchi or rales.  Abdominal: Soft, nontender, nondistended, no palpable masses, no guarding, no rebound  Musculoskeletal: Full range of motion of joints, no deformities appreciated.  Neurological: Alert intermittently, not oriented, not speaking, unable to move right side of body.  Right-sided facial droop  Psychological: Normal affect and mood.  Integument: No rashes appreciated          RESULTS       Labs Ordered and Resulted from Time of ED Arrival to Time of ED Departure   BASIC METABOLIC PANEL - Abnormal       Result Value    Sodium 140      Potassium 4.2      Chloride 108 (*)     Carbon Dioxide (CO2) 21 (*)     Anion Gap 11      Urea Nitrogen 18.3      Creatinine 1.24 (*)     Calcium 9.3      Glucose 107 (*)     GFR Estimate 55 (*)    TROPONIN T, HIGH SENSITIVITY - Abnormal    Troponin T, High Sensitivity 69 (*)    CBC WITH PLATELETS AND DIFFERENTIAL - Abnormal    WBC Count 5.3      RBC Count 3.80 (*)     Hemoglobin 12.1 (*)     Hematocrit 36.5 (*)     MCV 96      MCH 31.8       MCHC 33.2      RDW 12.9      Platelet Count 136 (*)     % Neutrophils 58      % Lymphocytes 27      % Monocytes 11      % Eosinophils 3      % Basophils 1      % Immature Granulocytes 0      NRBCs per 100 WBC 0      Absolute Neutrophils 3.1      Absolute Lymphocytes 1.4      Absolute Monocytes 0.6      Absolute Eosinophils 0.1      Absolute Basophils 0.1      Absolute Immature Granulocytes 0.0      Absolute NRBCs 0.0     INR - Normal    INR 1.10     PARTIAL THROMBOPLASTIN TIME - Normal    aPTT 27         CTA Head Neck with Contrast   Final Result   IMPRESSION:    HEAD CT:   1.  No intracranial hemorrhage or CT evidence for an acute infarct.   2.  5.5 x 8.1 x 5.1 mm slightly hyperdense rounded extra-axial density over the right temporal convexity, likely meningioma. This can be confirmed with brain MRI.   3.  Mild diffuse cerebral parenchymal volume loss. Presumed chronic hypertensive/microvascular ischemic white matter changes.   4.  Partially visualized large periapical lucency involving tooth #10, suspicious for odontogenic disease.      Discussed the head CT findings with Dr. Herr at 5:38 PM, 09/12/2023.      HEAD CTA:    1.  Occlusion of the mid M1 segment of the left middle cerebral artery. Poor leptomeningeal collateral flow to the left middle cerebral artery territory.   2.  Moderate narrowing of distal M2 branch of the right middle cerebral artery without occlusion.   3.  Short segment moderate narrowing of the distal P2/proximal P3 segment of the right posterior cerebral artery.   4.  Moderate narrowing of the P3 segment of the right posterior cerebral artery.      NECK CTA:   1.  75-80% narrowing of the origin of the right internal carotid artery based on the NASCET criteria.   2.  Less than 50% narrowing of the proximal left internal carotid artery based on the NASCET criteria.   3.  Moderate narrowing of the origin of the right vertebral artery. Mild narrowing of the origin of the left vertebral artery.  Otherwise both vertebral arteries are patent throughout their course in the neck.      Discussed the CT angiogram findings with Dr. Herr at 5:42 PM, 09/12/2023      CT Head w/o Contrast    (Results Pending)                     PROCEDURES:  Procedures:  Procedures       Guero Herr DO  Emergency Medicine  [facility] EMERGENCY DEPARTMENT       Guero Herr DO  09/13/23 0037

## 2023-09-13 NOTE — PLAN OF CARE
Orientation: unresponsive  Activity: bedrest, extremely restless/agitated for first part of shift, took several meds to settle pt down  Diet/BS Checks: reg, but not eating  Tele:  na  IV Access/Drains: PIV, SL  Pain Management: PRN roxanol x 3  Abnormal VS/Results: na  Bowel/Bladder: incontinent of bladder   Skin/Wounds: femoral groin insertion site, some bleeding on dressing, scattered bruising throughout  Consults: na  D/C Disposition:   Other Info:  Pt suffered a stroke, family decided on comfort care measures

## 2023-09-13 NOTE — CONSULTS
"[facility] - Consult Note  Oncology    Referral Source: staff consult    Saw pt Juan Miguel \"Minh\" SONIA Hawkins per staff consult. Minh was sleeping and I spoke with family. Pt's son Sree and daughter Mitali were present along with each of their spouses. Family shared that Minh is ready for death, especially since the death of his wife Elinor four years ago. Family welcomed prayer around a peaceful death.    Plan: Informed family how to request  support should more needs arise.      Yolie Schrader  Chaplain Resident    [facility] routine referrals *[phone]  [facility] available 24/7 for emergent requests/referrals, either by paging the on-call  or by entering an ASAP/STAT consult in Epic (this will also page the on-call ).       "

## 2023-09-13 NOTE — PROCEDURES
[facility]     Endovascular Surgical Neuroradiology Post-Procedure Note    Pre-Procedure Diagnosis:  L M1 occlusion, acute stroke  Post-Procedure Diagnosis:  Left M1 Occlusion, acute stroke    Procedure(s):   Diagnostic cervicocerebral angiography    Findings:  Left M1 occlusion with aborted mechanical thrombectomy     Plan:  Admit to ICU  Ongoing goals of care discussion    Primary Surgeon:  Dr. Katelyn Oseguera  Secondary Surgeon:  Not applicable  Secondary Surgeon Review:  None  Fellow:  Karina Hall MD, Theresa Arnold MD  Additional Assistants:  None    Prior to the start of the procedure and with procedural staff participation, I verbally confirmed: the patient s identity using two indicators, relevant allergies, that the procedure was appropriate and matched the consent or emergent situation, and that the correct equipment/implants were available. Immediately prior to starting the procedure I conducted the Time Out with the procedural staff and re-confirmed the patient s name, procedure, and site/side. (The Joint Commission universal protocol was followed.)  Yes    PRU value: Not applicable    Anesthesia:  Conscious Sedation  Medications:  Fentanyl, Midazolam 10ml 1% Lidocaine subcutaneous  Puncture site:  Right Femoral Artery    Fluoroscopy time (minutes):  18.9  Radiation dose (mGy):   558.47     Contrast amount (mL):   30      Estimated blood loss (mL):  200ml    Closure:  Device    Disposition:  Will be followed in hospital by the Neuro Critical Care/Stroke team.        Sedation Post-Procedure Summary    Sedatives: Fentanyl and Midazolam (Versed)    Vital signs and pulse oximetry were monitored and remained stable throughout the procedure, and sedation was maintained until the procedure was complete.  The patient was monitored by staff until sedation discharge criteria were met.    Patient tolerance:   Patient tolerated procedure well with stable vitals, right groin hematoma  with evidence of hemostasis.    Time of sedation in minutes:  38 minutes from beginning to end of physician one to one monitoring.  Theresa Arnold MD  Endovascular Surgical Neuroradiology Fellow  [facility]  [phone]    Endovascular Surgical Neuroradiology staff is Dr. Oseguera    See official report in PACS. The case was aborted after further discussions with the patient's family.  FINA Oseguera MD

## 2023-09-13 NOTE — H&P
"[facility]    History and Physical : Hospitalist Service:        Date of Admission:  9/12/2023    Cumulative Summary:   Juan Miguel Hawkins is a 89 year old male with past medical history significant for essential hypertension, hyperlipidemia, mild depression, prostate cancer, chronic thrombocytopenia who was transferred to [facility] to undergo emergent thrombectomy after patient presented with acute ischemic stroke resulting from left M1 occlusion.  Considering patient wishes, family decided to change goals of care to comfort care and endovascular treatment was aborted and patient was requested to be admitted for comfort care management.    Assessment & Plan     End-of-life care;  Acute ischemic stroke secondary to M1 occlusion\"    As above, patient developed acute right-sided weakness at 6:45 PM when he was in the car.  Patient was brought to the outside ER.  Patient has been on Xarelto due to history of DVT.  Patient underwent CT scan of the head and CTA head and neck and was found to have left M1 occlusion.  His NIH score was 22 on presentation with global aphasia, partial gaze palsy, partial hemianopia, right-sided hemiplegia and mild to moderate sensory loss on the right side.  Patient had severe dysarthria.  Patient was evaluated by stroke neurology patient was not a candidate for TNK as patient was on DOAC agent.  Patient was considered to be a candidate for endovascular treatment for left M1 occlusion.  Patient was transferred to [facility] to undergo endovascular treatment fell while patient was in the intervention radiology urology suite and was undergoing diagnostic cerebral angiogram, family approached intervention neuroradiologist and requested the procedure to be aborted considering their father wishes.    --Patient is admitted for comfort care status.  --Detailed discussion with family at bedside, emotional support was provided.  --Patient has global aphasia, " not following any command, seems slightly restless.  --Will order artificial saliva, atropine ophthalmic drops, sublingual Roxanol, IV and sublingual --Ativan and Haldol as needed for agitation.  --Also comfort medications have been ordered.  --Will start patient on dysphagia diet only for comfort, discussed with family as patient is at increased risk for aspiration.  --We will also place consult for sharp plan.  --We will also place consult for  to have further discussion regarding disposition planning depending upon patient clinical status tomorrow.    Other medical conditions not addressed at this point:    History of DVT of femoral vein of left lower extremity  Benign prostate hyperplasia  Essential hypertension  Hyperlipidemia  Insomnia  Major depression.  Personal history of colon cancer.  Prostate cancer.  Chronic thrombocytopenia.  History of TIA  History of falls at home      Clinically Significant Risk Factors Present on Admission               # Drug Induced Coagulation Defect: home medication list includes an anticoagulant medication  # Drug Induced Platelet Defect: home medication list includes an antiplatelet medication   # Hypertension: Noted on problem list                 Diet: NPO per Anesthesia Guidelines for Procedure/Surgery Except for: Meds    Chen Catheter: Not present  DVT Prophylaxis: Comfort care status  Code Status: Comfort Care    Disposition: Likely inpatient to stay more than 2 nights, further evaluation for [facility] hospice for inpatient hospice admission versus discharge with hospice care can be addressed tomorrow by drew SCALES and care coordinator.    The patient's care was discussed with the Bedside Nurse, Patient, Patient's Family, and neuroradiology team Team.    Medical Decision Making       75 MINUTES SPENT BY ME on the date of service doing chart review, history, exam, documentation & further activities per the note.         Viri Tello MD,  FACP    ----------------------------------------------------------------------------------------------------------------------    Primary Care Physician   Physician No Ref-Primary    Chief Complaint   Admission for comfort care, admitted with acute stroke.    History is obtained from the patient, family      Patient Active Problem List   Diagnosis    Acute deep vein thrombosis (DVT) of femoral vein of left lower extremity (H)    Benign prostatic hyperplasia with lower urinary tract symptoms    Essential hypertension    Hyperlipidemia    Increased prostate specific antigen (PSA) velocity    Insomnia    Mild major depression (H)    Personal history of colon cancer    Prostate cancer (H)    Rotator cuff tear, non-traumatic, left    Chronic thrombocytopenia (H)    Expressive aphasia    Carcinoma in situ of other specified sites    ED (erectile dysfunction)    Neck pain    Right facial droop    Transient ischemic attack    Stenosis of right vertebral artery    Elevated troponin level    Fall at home       History of Present Illness     Juan Miguel Hawkins is a 89 year old male with past medical history significant for essential hypertension, hyperlipidemia, mild depression, prostate cancer, chronic thrombocytopenia who presented to the outside emergency room with acute onset right-sided weakness at 6:45 PM when he was in the car.  Patient was brought to the emergency department.  Patient has been on Xarelto due to history of DVT.  Patient underwent CT scan of the head and CTA head and neck which was noted to have left M1 occlusion.  His NIH score was 22 on presentation with global aphasia, partial gaze palsy, partial hemianopia, right-sided hemiplegia with mild to moderate sensory loss on the right side.  Patient was noticed to have severe dysarthria.  Patient was evaluated by stroke neurology , patient was not a candidate for TNK as DOAC dose was within 48 hours but patient was a candidate for endovascular treatment for  left M1 occlusion.  Patient was transferred to the [facility] to undergo thrombectomy.  Stroke neurology also recommended MRI of the brain with and without contrast.    Patient was brought to the [facility] intervention neurology suite, while the procedure was a started and patient has just undergone diagnostic cerebral angiogram family approach Dr. Hall from intervention neurology, and requested not to proceed with further intervention.  According to family, patient has been very clear to multiple family members in the past few months that he would not like any aggressive management if he is acutely sick.  His wife of 65 years have also passed earlier this year and patient does not wish to be kept alive if he gets significantly sick.  As patient presented with global aphasia and significant dense hemiplegia, family requested goals of care to be changed to comfort care.    Patient will be admitted to oncology floor for end-of-life care patient was seen and examined, unable to provide any history, patient does have global aphasia, son, daughter and 2 daughter-in-law's are present at the bedside, they would like patient to be comfortable all the questions were answered, plan of care was also discussed with bedside nursing and patient will be admitted for further evaluation and management.    Review of Systems   Review of systems not obtained due to patient factors - confusion, critical condition, and mental status    Past Medical History    I have reviewed this patient's medical history and updated it with pertinent information if needed.   Past Medical History:   Diagnosis Date    Stenosis of right vertebral artery 11/2/2019    70% per CTA 11/2/19.       Past Surgical History   I have reviewed this patient's surgical history and updated it with pertinent information if needed.  No past surgical history on file.    Prior to Admission Medications   Prior to Admission Medications   Prescriptions Last Dose  Informant Patient Reported? Taking?   aspirin (ASA) 325 MG tablet   Yes No   ibuprofen (ADVIL/MOTRIN) 400 MG tablet   Yes No   Sig: Take 400 mg by mouth   mirtazapine (REMERON) 15 MG tablet   Yes No   Sig: Take 15 mg by mouth every evening   triazolam (HALCION) 0.25 MG tablet   Yes No   Sig: Take 0.25 mg by mouth nightly as needed for sleep   vardenafil (LEVITRA) 20 MG tablet   Yes No   Sig: Take 20 mg by mouth   warfarin ANTICOAGULANT (COUMADIN) 2.5 MG tablet   Yes No   Sig: Take 2 tablet (5mg) on Sun/Thurs and 1 1/2 tablets (3.75mg) all other days OR as directed by INR Nurse.      Facility-Administered Medications: None     Allergies   Allergies   Allergen Reactions    Morphine Nausea and Vomiting     Other reaction(s): Gastrointestinal     Social History   Unable to obtain social history due to patient mentation    Family History   Unable to obtain family history due to patient mentation    Physical Exam       BP: (!) 181/88 Pulse: 68   Resp: 14 SpO2: 100 % O2 Device: None (Room air) Oxygen Delivery: 2 LPM  Vital Signs with Ranges  Pulse:  [63-82] 68  Resp:  [12-18] 14  BP: (165-205)/() 181/88  SpO2:  [95 %-100 %] 100 %  0 lbs 0 oz    Constitutional: Global aphasia, slightly confused, not following any commands, not answering any questions, only present at the bedside  Eyes: Conjunctiva and pupils examined and normal.  HEENT: Moist mucous membranes, normal dentition.  Respiratory: Clear to auscultation bilaterally, no crackles or wheezing.  Cardiovascular: Regular rate and rhythm, normal S1 and S2, and no murmur noted.  GI: Soft, non-distended, non-tender, normal bowel sounds.  Lymph/Hematologic: No anterior cervical or supraclavicular adenopathy.  Skin: No rashes, no cyanosis, no edema.  Musculoskeletal: No joint swelling, erythema or tenderness.  Neurologic: Global aphasia, significant dysarthria, right-sided hemiplegia, gait was not checked      Data   Data reviewed today:  I personally reviewed head CT  which is showing possible meningioma, CTA head is showing occlusion of mid M1 segment of left middle cerebral artery and 75 to 80% narrowing of the origin of the right internal carotid artery was also seen.      Recent Labs   Lab 09/12/23  1814 09/12/23  1732   WBC  --  5.3   HGB  --  12.1*   MCV  --  96   PLT  --  136*   INR 1.13 1.10   NA  --  140   POTASSIUM  --  4.2   CHLORIDE  --  108*   CO2  --  21*   BUN  --  18.3   CR  --  1.24*   ANIONGAP  --  11   SONYA  --  9.3   GLC  --  107*       Imaging:  Recent Results (from the past 24 hour(s))   CTA Head Neck with Contrast    Narrative    EXAM: CTA HEAD NECK W CONTRAST  LOCATION: [facility]  DATE: 9/12/2023    INDICATION: Code Stroke to evaluate for potential thrombolysis and thrombectomy. PLEASE READ IMMEDIATELY. Complete right-sided weakness.  COMPARISON: None.  CONTRAST: 75 mL of Isovue-370  TECHNIQUE: Head and neck CT angiogram with IV contrast. Noncontrast head CT followed by axial helical CT images of the head and neck vessels obtained during the arterial phase of intravenous contrast administration. Axial 2D reconstructed images and   multiplanar 3D MIP reconstructed images of the head and neck vessels were performed by the technologist. Dose reduction techniques were used. All stenosis measurements made according to NASCET criteria unless otherwise specified.    FINDINGS:   NONCONTRAST HEAD CT:   INTRACRANIAL CONTENTS: No intracranial hemorrhage. Mild diffuse cerebral parenchymal volume loss. No midline shift. The basilar cisterns are patent. Mild periventricular and scattered foci of deep white matter hypodensities involving both cerebral   hemispheres. No CT evidence for an acute infarct. No cerebellar tonsillar ectopia. 5.5 x 8.1 x 5.1 mm slightly hyperdense rounded extra-axial density over the right temporal convexity.    VISUALIZED ORBITS/SINUSES/MASTOIDS: No intraorbital abnormality. No paranasal sinus mucosal disease. No middle  ear or mastoid effusion. Mild nasoseptal bowing to the right anteriorly. Partially visualized large periapical lucency involving tooth #10,   suspicious for odontogenic disease.    BONES/SOFT TISSUES: No acute abnormality.    HEAD CTA:  ANTERIOR CIRCULATION: Occlusion of the mid M1 segment of the left middle cerebral artery. Poor leptomeningeal collateral flow to the left middle cerebral artery territory. The anterior cerebral arteries are patent without hemodynamically significant   stenosis. Moderate narrowing of distal M2 branch of the right middle cerebral artery without occlusion. Standard [place] anatomy.    POSTERIOR CIRCULATION: The intracranial vertebral arteries and the basilar artery are patent without hemodynamically significant stenosis. Short segment moderate narrowing of the distal P2/proximal P3 segment of the right posterior cerebral artery.   Moderate narrowing of the P3 segment of the right posterior cerebral artery. Balanced vertebral arteries supply a normal basilar artery.     DURAL VENOUS SINUSES: The major dural venous sinuses are not well opacified on this exam.    NECK CTA:  RIGHT CAROTID: Extensive plaque of the right carotid bulb. 75-80% narrowing of the origin of the right internal carotid artery based on the NASCET criteria.    LEFT CAROTID: Mild to moderate narrowing of the proximal left internal carotid artery. Less than 50% narrowing of the proximal left internal carotid artery based on the NASCET criteria.    VERTEBRAL ARTERIES: Moderate narrowing of the origin of the right vertebral artery. Mild narrowing of the origin of the left vertebral artery. Otherwise both vertebral arteries are patent throughout their course in the neck. Balanced vertebral arteries.    AORTIC ARCH: Common origin of the brachiocephalic artery and the left common carotid artery. The origins of the arch vessels are patent without hemodynamically significant stenosis.    NONVASCULAR STRUCTURES:  Moderate degenerative changes of the cervical spine. The lung apices are clear.      Impression    IMPRESSION:   HEAD CT:  1.  No intracranial hemorrhage or CT evidence for an acute infarct.  2.  5.5 x 8.1 x 5.1 mm slightly hyperdense rounded extra-axial density over the right temporal convexity, likely meningioma. This can be confirmed with brain MRI.  3.  Mild diffuse cerebral parenchymal volume loss. Presumed chronic hypertensive/microvascular ischemic white matter changes.  4.  Partially visualized large periapical lucency involving tooth #10, suspicious for odontogenic disease.    Discussed the head CT findings with Dr. Herr at 5:38 PM, 09/12/2023.    HEAD CTA:   1.  Occlusion of the mid M1 segment of the left middle cerebral artery. Poor leptomeningeal collateral flow to the left middle cerebral artery territory.  2.  Moderate narrowing of distal M2 branch of the right middle cerebral artery without occlusion.  3.  Short segment moderate narrowing of the distal P2/proximal P3 segment of the right posterior cerebral artery.  4.  Moderate narrowing of the P3 segment of the right posterior cerebral artery.    NECK CTA:  1.  75-80% narrowing of the origin of the right internal carotid artery based on the NASCET criteria.  2.  Less than 50% narrowing of the proximal left internal carotid artery based on the NASCET criteria.  3.  Moderate narrowing of the origin of the right vertebral artery. Mild narrowing of the origin of the left vertebral artery. Otherwise both vertebral arteries are patent throughout their course in the neck.    Discussed the CT angiogram findings with Dr. Herr at 5:42 PM, 09/12/2023

## 2023-09-14 PROBLEM — Z51.5 HOSPICE CARE: Status: ACTIVE | Noted: 2023-01-01

## 2023-09-14 NOTE — PLAN OF CARE
Goal Outcome Evaluation:         9/14/23 [id number]:    Orientation: unresponsive- DELANEY, non-verbal. Restless at times.   Activity: bedrest, q2h T/R per family request.  Diet/BS Checks:puree diet, no eating and drinking. Oral care provided by family.   Tele:  none  IV Access/Drains: L PIV SL, dried blood and positional. Still flushing fine.   Pain Management: PRN IV Morphine, Roxanol. Scheduled Oxycodone.  Abnormal VS/Results: n/a  Bowel/Bladder: incontinent bladder, no BM. Wet brief x1.   Skin/Wounds: scattered bruising  Consults:  GIP, SW  D/C Disposition: patient expected to pass in the hospital.  Other Info:  GIP hospice, Comfort care measures focus. [place]unker suction provided x3 due to secretion. Pt resting comfortable during shift.  Restless at times managed with PRN ativan-improvement. Family members at bedside with support.

## 2023-09-14 NOTE — PROGRESS NOTES
SW: Writer talked with Gretta from [facility], patient is admitting to hospice services.     SHEA Torres

## 2023-09-14 NOTE — H&P
[facility]    History and Physical  Hospitalist    Juan Miguel Hawkins MRN# [id number]   Age: 89 year old YOB: 1931     Date of Admission:  9/14/2023    Primary care provider: No Ref-Primary, Physician          Assessment and Plan:          Juan Miguel Hawkins is a 89 year old male with hypertension, hyperlipidemia, mild depression, prostate cancer, chronic thrombocytopenia admitted under inpatient hospice on 9/14/2023 for comfort focused care.    Patient had presented to outside ED on 9/12/2023 with acute onset right-sided weakness, dysarthria.  PTA on Xarelto due to history of DVT, A-fib. Found to have left M1 occlusion on imaging. NIH score was 22 on presentation with global aphasia, partial gaze palsy, partial hemianopia, right-sided hemiplegia and mild to moderate sensory loss on the right side per report.    Patient was evaluated by stroke neurology patient was not a candidate for TNK as patient was on DOAC agent. Considered to be a candidate for endovascular treatment for left M1 occlusion.  Patient was transferred to [facility] to undergo endovascular intervention.   --Considering patient wishes, family opted for comfort focused care and endovascular treatment was aborted.    -- Patient's family had discussed with [facility] hospice.  Patient discharged from inpatient unit, readmitted under inpatient hospice on 9/14/2023.    Impression.  Comfort care patient.  Acute ischemic stroke secondary to M1 occlusion.    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  Hyperlipidemia.  Benign prostate hyperplasia  Insomnia  Major depression.  Personal history of colon cancer.  Prostate cancer.  Chronic thrombocytopenia.  History of TIA  History of falls at home    Assessment and plan.  Patient resting comfortably in bed.  Family by bedside, questions  answered.  IV/p.o. as needed pain meds.  IV/p.o. as needed Ativan   Oxygen as needed for comfort.  Urinary catheter as needed for comfort.  Symptom management with teardrops, atropine ophthalmic solution, bowel meds.  [facility] hospice consult requested.     Orders Placed This Encounter      Pureed Diet (level 4) Liquidized/Moderately Thick (level 3)      DVT Prophylaxis: Comfort care.  Code Status: No CPR- Do NOT Intubate  Disposition: Expected discharge pending clinical course.     Discussed with patient, bedside RN.  Updated patient's son and daughter-in-law by the bedside  More than 70% of time spent in direct patient care, care coordination, patient counseling, and formalizing plan of care.      Shannan Cavazos MD          Chief Complaint:     Juan Miguel Hawkins is a 89 year old male with hypertension, hyperlipidemia, mild depression, prostate cancer, chronic thrombocytopenia admitted under inpatient hospice on 9/14/2023 for comfort focused care.    Patient had presented to outside ED on 9/12/2023 with acute onset right-sided weakness, dysarthria.  PTA on Xarelto due to history of DVT, A-fib. Found to have left M1 occlusion on imaging. NIH score was 22 on presentation with global aphasia, partial gaze palsy, partial hemianopia, right-sided hemiplegia and mild to moderate sensory loss on the right side per report.    Patient was evaluated by stroke neurology patient was not a candidate for TNK as patient was on DOAC agent. Considered to be a candidate for endovascular treatment for left M1 occlusion.  Patient was transferred to [facility] to undergo endovascular intervention.   --Considering patient wishes, family opted for comfort focused care and endovascular treatment was aborted.    -- Patient's family had discussed with [facility] hospice.  Patient discharged from inpatient unit, readmitted under inpatient hospice on 9/14/2023.          Review of Systems:     Unable to obtain review of  systems due to mentation.  Now admitting under comfort care status    Medical History:     Past Medical History:   Diagnosis Date    Stenosis of right vertebral artery 11/2/2019    70% per CTA 11/2/19.      Surgical History:   Surgical history reviewed, no pertinent surgical history to [facility].         Social History:      Unable to obtain social history due to patient mentation, now opting for comfort care         Family History:   Family history reviewed, no pertinent family history to [facility]         Allergies:     Allergies   Allergen Reactions    Morphine Nausea and Vomiting     Other reaction(s): Gastrointestinal             Medications:     Medications Prior to Admission   Medication Sig Dispense Refill Last Dose    aspirin (ASA) 325 MG tablet        ibuprofen (ADVIL/MOTRIN) 400 MG tablet Take 400 mg by mouth       mirtazapine (REMERON) 15 MG tablet Take 15 mg by mouth every evening       triazolam (HALCION) 0.25 MG tablet Take 0.25 mg by mouth nightly as needed for sleep       vardenafil (LEVITRA) 20 MG tablet Take 20 mg by mouth       warfarin ANTICOAGULANT (COUMADIN) 2.5 MG tablet Take 2 tablet (5mg) on Sun/Thurs and 1 1/2 tablets (3.75mg) all other days OR as directed by INR Nurse.              Physical Exam      Vital Signs with Ranges  Resp:  [7-25] 19    PHYSICAL EXAM  GENERAL: Patient is in no distress.  Mouth breather.  LUNGS: Respirations unlabored  NEURO: Somnolent.  EXTREMITIES: No pedal edema  SKIN: Warm, dry.   PSYCHIATRY somnolent.       Data:   All new lab and imaging data was reviewed.

## 2023-09-14 NOTE — PLAN OF CARE
Pt admitted to [facility] hospice. Comfort cares maintained. T/R as tolerated/ at family request. PRN ativan, PRN robinol, PRN atropine, PRN morphine given through out night. Required suction to alleviate distress d/t secretions. Family remains at bed side.

## 2023-09-14 NOTE — CONSULTS
"SPIRITUAL HEALTH SERVICES - Consult Note  [facility] MedSurg/Onc 88    Referral/Request Source: consult for emotional support    Brief visit with pt Minh's family - as they were all gathered at bedside. Family supporting one another at this time and expressed no needs. Finding comfort in Minh approaching the end of his life \"on his own terms.\"    Plan: Spiritual Health remains available. Please contact as needs arise.    Gretta Larkin  Associate     [facility] routine referrals *[phone]  [facility] available 24/7 for emergent requests/referrals, either by paging the on-call  or by entering an ASAP/STAT consult in Epic (this will also page the on-call ).   "

## 2023-09-14 NOTE — DISCHARGE SUMMARY
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       History of Present Illness   Juan Miguel Hawkins is an 89 year old male who presented with stroke symptoms.    Hospital Course     Juan Miguel Hawkins is a 89 year old male with hypertension, hyperlipidemia, mild depression, prostate cancer, chronic thrombocytopenia admitted under inpatient hospice on 9/14/2023 for comfort focused care.     Patient had presented to outside ED on 9/12/2023 with acute onset right-sided weakness, dysarthria.  PTA on Xarelto due to history of DVT, A-fib. Found to have left M1 occlusion on imaging. NIH score was 22 on presentation with global aphasia, partial gaze palsy, partial hemianopia, right-sided hemiplegia and mild to moderate sensory loss on the right side per report.    Patient was evaluated by stroke neurology patient was not a candidate for TNK as patient was on DOAC agent. Considered to be a candidate for endovascular treatment for left M1 occlusion.  Patient was transferred to [facility] to undergo endovascular intervention.   --Considering patient wishes, family opted for comfort focused care and endovascular treatment was aborted.    -- Patient's family had discussed with [facility] hospice.  Patient discharged from inpatient unit, readmitted under inpatient hospice on 9/14/2023.     Impression.  Comfort care patient.  Acute ischemic stroke secondary to M1 occlusion.     Medical problems.    History of DVT of femoral vein of left lower extremity on anticoagulation  Atrial fibrillation on  anticoagulation  Hypertension.  Hyperlipidemia.  Benign prostate hyperplasia  Insomnia  Major depression.  Personal history of colon cancer.  Prostate cancer.  Chronic thrombocytopenia.  History of TIA  History of falls at home    Shannan Cavazos MD.         Resp:  [7-25] 21  No intake/output data recorded.  PHYSICAL EXAM  GENERAL: Patient is in no distress.  Mouth breather.  LUNGS: Respirations unlabored  NEURO: Somnolent.  EXTREMITIES: No pedal edema  SKIN: Warm, dry.   PSYCHIATRY somnolent.  )Consultations This Hospital Stay   SPIRITUAL HEALTH SERVICES IP CONSULT  CARE MANAGEMENT / SOCIAL WORK IP CONSULT  [facility] INPATIENT HOSPICE ADULT CONSULT  [facility] INPATIENT HOSPICE ADULT CONSULT    Time Spent on this Encounter   I, Shannan Cavazos MD, personally saw the patient today and spent < 30 minutes discharging this patient. Discussed with patient, bedside RN, patient's son and daughter-in-law by the bedside    Discharge Disposition   Discharge, readmit to inpatient hospice.  Condition terminal.      Allergies   Allergies   Allergen Reactions    Morphine Nausea and Vomiting     Other reaction(s): Gastrointestinal       DATA  Most Recent 3 CBC's:  Recent Labs   Lab Test 09/12/23  1732 11/02/19  0616 11/01/19  1752   WBC 5.3 3.7* 5.4   HGB 12.1* 12.6* 13.5   MCV 96 94 94   * 123* 144*      Most Recent 3 BMP's:  Recent Labs   Lab Test 09/12/23  1732 11/02/19  0616 11/01/19  1752    144 144   POTASSIUM 4.2 3.7 3.6   CHLORIDE 108* 114* 111*   CO2 21* 23 24   BUN 18.3 15 17   CR 1.24* 0.99 1.04   ANIONGAP 11 7 9   SONYA 9.3 8.0* 8.1*   * 85 129*     Most Recent 2 LFT's:  Recent Labs   Lab Test 11/01/19  1752   AST 30   ALT 20   ALKPHOS 60   BILITOTAL 0.4       Results for orders placed or performed during the hospital encounter of 09/12/23   IR Carotid Cerebral Angiogram Bilateral    Narrative    Cerebral Angiography Report   [id number]  DARIELA MAI  6/13/1931 9/12/2023 8:03 PM    Brief History:  "89-year-old male with a past medical history of atrial  fibrillation on Xarelto, who presented last known normal 2 hours prior  to arrival with right-sided weakness and expressive and receptive  aphasia. On CTA imaging it was noted that he had a left M1 occlusion.  NeuroIR was consulted for emergent cerebral angiogram and mechanical  thrombectomy for stroke treatment  Indication for the procedure: Left M1 occlusion with profound deficits  of right-sided weakness and aphasia presenting within 24-hour time  period.    Attending/s: Katelyn Oseguera MD  Fellow/s: Karina Hall MD, Theresa Arnold MD    Anesthesia: Local anesthesia and conscious sedation  Fluoro time: 18.9 minutes  Fluoro dose: 558.47 mGy  Contrast used: 3 mL of Visi-320  Sedation time: 38 minutes of 1:1 sedation monitoring by the physician  Sedatives: IV Fentanyl   25  mcg, IV Midazolam 1 mg.   Other Medications: Subcutaneous 1% lidocaine    Procedure:   1. Diagnostic cerebral angiography and interpretation of the images.  2. Selective catheter angiography of the left common carotid artery,  left internal carotid artery, left middle cerebral artery. 4.  Diagnostic angiography of the right common femoral artery.   5. Percutaneous closure of a right femoral arteriotomy using a 8 Fr  Angio-seal closure device.    Time course:  Last known well 1645.  Stroke code 1719  ESN team notified 1748  Groin Puncture 1929    Devices:  8F short sheath  Nathan Balloon guide catheter 8 F 95 cm  Cook Shuttle 6 F 80/90 cm   Elida Distal Access Catheter 6 F 125 cm  Headway 21 microcatheter 156 cm  Synchro Standard microwire 0.014\" x 200cm    Consent:   The risks and benefits of conventional cerebral angiography and  mechanical thrombectomy were discussed with the patient and their  family who agreed to proceed. The risks discussed included stroke,  intracerebral hemorrhage, arterial dissection in the head, neck,  chest, abdomen, pelvis, or groin, groin hematoma, " arteriovenous  fistula in the groin, and pseudoaneurysm of the femoral artery.  Subsequently, verbal informed consent was obtained over the phone with  the patient's son and daughter-in-law.    Technique/findings:   The patient was brought to the angiography suite and placed in the  supine position. The medications were administered by the radiology  nursing staff. The nursing staff independently monitored the patient's  vital signs during the procedure.   ?  The patient's bilateral groins were prepped and draped in the standard  fashion. The right common femoral artery was palpated. Lidocaine was  injected locally and a small skin incision was made over the femoral  artery using a scalpel. A 21 gauge needle was placed into the right  femoral artery which was exchanged for an 8 F dilator over a nitinol  wire, which was exchanged for an 8F Short sheath over a J-wire. The  sheath was connected to a continuous flush of heparinized saline. The  Balloon guide catheter was carefully advanced through the abdominal  and thoracic aorta over the diagnostic catheter and glidewire.     The diagnostic catheter shape was reconstituted in the left subclavian  artery. The wire was removed. The diagnostic catheter was used to  selectively catheterize the left common carotid artery. The guide  catheter was advanced over the wire and diagnostic catheter into the  left internal carotid artery. Angiography was performed over the  cranium demonstrating left M1 occlusion. Under fluoroscopic guidance,  the distal access catheter was advanced over the microcatheter and  microwire to the tip of the base catheter. The microwire was advanced  under fluoroscopic guidance intracranially up to the site of the  occlusion and the microcatheter was then advanced over the microwire.  At this point in the procedure there was a general consensus between  the patient's family members to abort the procedure, and they  expressed that it would not be in  line with his wishes. The procedure  was aborted and the catheters were removed.     Upon completion of the procedure the sheath was removed. Hemostasis  was obtained using an 8 F angio-seal closure device. The procedure was  completed with complication of right groin hematoma with evidence of  right femoral artery extravasation around the 8 Georgian short sheath.  The patient was transferred to the ICU in stable condition.    FINDINGS: ?    Left internal carotid artery injection: Cranial view  Biplane angiography was performed over the cranium. Cranial view of  the left internal carotid artery injection in the AP and lateral  projections demonstrates a tortuous but otherwise normal cervical  segment, petrous, laceral, cavernous, clinoid, ophthalmic artery with  subsequent normal choroidal blush, and communicating segments of the  left internal carotid artery. There is a right fetal posterior  cerebral artery. The left internal carotid artery bifurcates into the  left anterior cerebral artery and left middle cerebral artery. The  proximal segment and distal branches of the right anterior cerebral  artery are normal. There is an abrupt cutoff of the left middle  cerebral artery at its proximal superior division left M2 segment with  absence of opacification of the distal branches of the middle cerebral  artery. Other notable objects in the visual field are evidence of  dental hardware.     ?  Right common femoral artery injection: Pelvic view   Angiography was performed over the right pelvis by injection of the  sheath. Pelvic view of the right common femoral artery in the MCDANIEL  projection demonstrates a normal common femoral artery, superficial  femoral artery, and deep femoral artery. The insertion point of the  sheath is located above the bifurcation. There is evidence of a small  amount of extravasation around the insertion point of the short  sheath. No significant stenosis, dissection or pseudoaneurysm  is  visualized. Also noted within the visual field in the pelvic corner or  evidence of multiple small mayur shaped objects of varying sizes there  are presumably surgical hardware.    Dr. Katelyn Oseguera MD was present for the entire procedure.   ?    Impression    Impression:   1. There is a proximal Left M2 superior division occlusion absence of  distal flow.  2. Right femoral artery demonstrates a small amount of extravasation.      Theresa Arnold MD  Neuroendovascular Surgery Fellow  Pager: [phone]

## 2023-09-14 NOTE — CONSULTS
[facility]    Consult Note - [facility]  _________________________________________________________________    [facility] 24/7 Contact Number: [phone]    - Providers: Please contact [facility] with changes in orders or clinical plan of care   - Nursing: Please contact [facility] with significant changes in patient condition    Hospice will notify the care team (including the hospitalist) to confirm date of inpatient hospice ([facility]IP) admission.    New Epic encounter will not be created until hospice completes admission.   ______________________________________________________________________        Hospice Diagnosis: Acute ischemic stroke secondary to M1 occlusion    Indication for Inpatient Hospice: excess secretions    Goals for Hospital Discharge: less than 3 prn doses in 24 hours maintained for 48 hours    Plan of Care Discussed with the Following:   - Nurse: Javier Acevedo RN  - Charge Nurse: none  - Hospitalist/Rounding Provider: Dr. Roberto Orozco at 9:55pm    - Juan Miguel's Family/Preferred Contact: lots of family including all kids in the room  - Hospice Provider: Dr. Adam Braden    Summary of Visit (includes assessment, medications and any new orders):   Met with Juan Miguel and his family in his hospital room. Many family members were present. Provided emotional support and explained symptom management. Juan Miguel is in bed, eyes closed and unresponsive to voice or touch. Occasional moan seaming to attempt to communicate. No facial grimacing or other distress noted. Occasional move of left arm. Noisy, rattle breathing. Nurse gave PRN robinul. No effect after 15 minutes, ordered atropine to the floor to administer. PPS 10%, ADL score 6, heart rate between 120 and 150, and O2 91%. No new orders needed. Spoke to on call Hospitalist Roberto Orozco MD who approved [facility] hospice admission. Called SOC to flip bed for [facility] hospice.       Janelle Rodriguez RN

## 2023-09-14 NOTE — CONSULTS
[facility]    Progress Note - [facility]    ______________________________________________________________________    [facility] 24/7 Contact Number: [phone]    - Providers: Please contact [facility] with changes in orders or clinical plan of care   - Nursing: Please contact [facility] with significant changes in patient condition  ______________________________________________________________________        Plan of Care Discussed with the Following:   - Nurse: Tereso Fofana  - Hospitalist/Rounding Provider: Shannan Cavazos MD     - [facility] Family/Preferred Contact: Daughters in room  - Hospice Provider: Dr. Adam Braden    Summary of Visit (includes assessment findings, discharge planning progress/status or any new orders):   Met with family in [facility] hospital room. He is resting in bed, not responsive to stimuli. O2 sat 81 on room air and respiratory rate 20 gasping. No grimace. Contacted Hospice Dr. Adam Braden for recommendations: schedule morphine 10mg q3h and keep q1h PRN for pain or dyspnea keeping breathing under 20 respirations per minute. Discussed at nurse station with Nurse Rider. Paged Dr. Cavazos and discussed on phone.              Janelle Rodriguez RN

## 2023-09-14 NOTE — PROVIDER NOTIFICATION
MD Notification    Notified Person: MD    Notified Person Name: Macy    Notification Date/Time: 9/14/23 1630    Notification Interaction:   iram  Purpose of Notification:      hospice RN told me she would order Morphine IV schedule for the patient but then I saw you ordered scheduled Oxycodone High Sol. I just saw her and she said she would contact you to verify whether Morphine or Oxycodone? Just want to clarify! thanks.     Orders Received:    Per MD- Scheduled Oxycodone is fine. Use Morphine PRN as needed.   Comments:

## 2023-09-15 PROBLEM — Z51.5 HOSPICE CARE PATIENT: Status: ACTIVE | Noted: 2023-01-01

## 2023-09-15 NOTE — PLAN OF CARE
Comfort cares maintained. T/R as tolerated. 1 wet brief. Scheduled oxycodone Q4hr. PRN ativan x1, PRN atropine drops x1. Pt appears comfortable. Family remains at bedside.

## 2023-09-15 NOTE — PROGRESS NOTES
"[facility]  Hospitalist Progress Note   09/15/2023          Assessment and Plan:       Juan Miguel Hawkins is a 89 year old male with hypertension, hyperlipidemia, mild depression, prostate cancer, chronic thrombocytopenia who was transferred to [facility] to undergo emergent thrombectomy after patient presented with acute ischemic stroke resulting from left M1 occlusion. Considering patient wishes, family decided to change goals of care to comfort care and endovascular treatment was aborted and patient was requested to be admitted for comfort care management.     Comfort care patient.  Acute ischemic stroke secondary to M1 occlusion\"  --Patient scented with acute onset right-sided weakness, dysarthria.  PTA on Xarelto due to history of DVT. CT scan of the head and CTA head and neck and was found to have left M1 occlusion. NIH score was 22 on presentation with global aphasia, partial gaze palsy, partial hemianopia, right-sided hemiplegia and mild to moderate sensory loss on the right side.    Patient was evaluated by stroke neurology patient was not a candidate for TNK as patient was on DOAC agent. Considered to be a candidate for endovascular treatment for left M1 occlusion.       Patient was transferred to [facility] to undergo endovascular treatment.  Family had opted for comfort care respecting patient's wishes and endovascular treatment aborted  --Again had detailed discussion with family at bedside on 9/15.  Continue supportive, symptom management.  GIP hospice following.  Morphine 10 mg every 4 hours scheduled per hospice team recommendation.  IV/p.o. as needed morphine.  IV/p.o. as needed Ativan.  Continue PRN artificial saliva, atropine ophthalmic drops, sublingual Roxanol, IV and sublingual   Diet as tolerated.     MEDICAL PROBLEMS  History of DVT of femoral vein of left lower extremity  Benign prostate hyperplasia  Essential hypertension  Hyperlipidemia  Insomnia  Major " depression.  Personal history of colon cancer.  Prostate cancer.  Chronic thrombocytopenia.  History of TIA  History of falls at home     Orders Placed This Encounter      Pureed Diet (level 4) Liquidized/Moderately Thick (level 3)      DVT Prophylaxis: Comfort care.  Code Status: No CPR- Do NOT Intubate  Disposition: Expected discharge pending clinical course.    Discussed with floor RN, hospice team.  Updated patient's daughter by the bedside.  >35 minutes spent by me on the date of service doing chart review, history, exam, documentation & further activities per the note.      Shannan Cavazos MD        Interval History:        Patient lying in bed.  Somnolent.  Appears comfortable.  Receiving scheduled narcotics per hospice team recommendation.  Unable to obtain review of systems.  Family by bedside, questions answered.          Physical Exam:        Physical Exam   Resp:  [18-25] 18    PHYSICAL EXAM  GENERAL: Patient is in no distress.  Mouth breather.  LUNGS: Respirations unlabored  NEURO: Somnolent.  EXTREMITIES: No pedal edema  SKIN: Warm, dry.   PSYCHIATRY somnolent.       Medications:         artificial saliva  1 spray Mouth/Throat 4x Daily    - MEDICATION INSTRUCTIONS -   Does not apply See Admin Instructions    oxyCODONE  10 mg Oral Q4H    sodium chloride (PF)  3 mL Intracatheter Q8H     acetaminophen, acetaminophen, acetaminophen, artificial saliva, atropine, bisacodyl, calcium carbonate, artificial tears, glycopyrrolate, lidocaine 4%, lidocaine (buffered or not buffered), LORazepam **OR** LORazepam, melatonin, mineral oil-hydrophilic petrolatum, morphine, morphine, morphine **OR** morphine sulfate, morphine **OR** morphine sulfate, naloxone, naloxone, naloxone, naloxone, ondansetron **OR** ondansetron, prochlorperazine **OR** prochlorperazine **OR** prochlorperazine, sodium chloride, sodium chloride (PF)         Data:      All new lab and imaging data was reviewed.

## 2023-09-15 NOTE — UTILIZATION REVIEW
"  Admission Status; Secondary Review Determination         Under the authority of the Utilization Management Committee, the utilization review process indicated a secondary review on the above patient.  The review outcome is based on review of the medical records, discussions with staff, and applying clinical experience noted on the date of the review.        (xxx)      Inpatient Status Appropriate - This patient's medical care is consistent with medical management for inpatient care and reasonable inpatient medical practice.      () Observation Status Appropriate - This patient does not meet hospital inpatient criteria and is placed in observation status. If this patient's primary payer is Medicare and was admitted as an inpatient, Condition Code 44 should be used and patient status changed to \"observation\".   () Admission Status NOT Appropriate - This patient's medical care is not consistent with medical management for Inpatient or Observation Status.          RATIONALE FOR DETERMINATION     Juan Miguel Hawkins is a 89 year old male with hypertension, hyperlipidemia, mild depression, prostate cancer, DVT, A-fib, and chronic thrombocytopenia who presented to outside ED on 9/12/2023 with acute onset right-sided weakness, dysarthria.  Found to have left M1 occlusion on imaging. NIH score was 22 on presentation with global aphasia, partial gaze palsy, partial hemianopia, right-sided hemiplegia and mild to moderate sensory loss on the right side per report.  Patient was evaluated by stroke neurology patient was not a candidate for TNK as patient was on DOAC agent. Considered to be a candidate for endovascular treatment for left M1 occlusion.  Patient was transferred to [facility] to undergo endovascular intervention.  After discussion with family considering patient wishes have opted for comfort care.  Patient admitted to hospice and [facility] hospice admission approved on 9/14/2023.  IP " "status appropriate.     I have paged Dr. Cavazos to discuss need for \"admit to GIP hospice\" order.    The severity of illness, intensity of service provided, expected LOS and risk for adverse outcome make the care complex, high risk and appropriate for hospital admission.        The information on this document is developed by the utilization review team in order for the business office to ensure compliance.  This only denotes the appropriateness of proper admission status and does not reflect the quality of care rendered.         The definitions of Inpatient Status and Observation Status used in making the determination above are those provided in the CMS Coverage Manual, Chapter 1 and Chapter 6, section 70.4.      Sincerely,     Christen Gutierrez MD  Physician Advisor   Utilization Review/ Case Management  [facility].    "

## 2023-09-15 NOTE — PROGRESS NOTES
[facility]    Consult Note - [facility]  _________________________________________________________________    [facility]  Contact Number: [phone]    - Providers: Please contact [facility] with changes in orders or clinical plan of care   - Nursing: Please contact [facility] with significant changes in patient condition    Hospice will notify the care team (including the hospitalist) to confirm date of inpatient hospice (GIP) admission.    New Epic encounter will not be created until hospice completes admission.   ______________________________________________________________________            Indication for Inpatient Hospice: Pain and nausea    Goals for Hospital Discharge: medications able to be given at  lower level of care.         Summary of Visit (includes assessment, medications and any new orders):   SW discussed discharge planning as part of the GIP LOC.     Family were expecting oseas to pass the first night he got to the hospital. The family stated Shah wishes were to not be a burden on the family, and didn't want tor return to his childrens home. Family is considering Residential hospice.    Oseas may be entering the active dying stage and may not be stable to transfer.     home is :  [place] [employer] [place]

## 2023-09-16 NOTE — PROGRESS NOTES
Patient passed at 4:55am; Edgar and his wife we sleeping at bedside and were woken up by RN.     All appropriate and indicated parties were called/notified regarding death record.  Post mortem cares finished.

## 2023-09-16 NOTE — PROGRESS NOTES
[facility]    Consult Note - [facility]  _________________________________________________________________    [facility] Contact Number: [phone]    - Providers: Please contact [facility] with changes in orders or clinical plan of care   - Nursing: Please contact [facility] with significant changes in patient condition    Hospice will notify the care team (including the hospitalist) to confirm date of inpatient hospice (GIP) admission.    New Epic encounter will not be created until hospice completes admission.   ______________________________________________________________________        Hospice Diagnosis: Acute ischemic stroke secondary to MI occlusion    Indication for Inpatient Hospice: excessive secretions and pain    Goals for Hospital Discharge: when management of secretions and pain then to a lower level of care.     Plan of Care Discussed with the Following:   - Nurse: CORNELL Laguerre   - Hospitalist/Rounding Provider: Dr. Cavazos    - Juan Miguel's Family/Preferred Contact: Genaro, son  - Hospice Provider: Dr. Adam Braden    Summary of Visit (includes assessment, medications and any new orders):   Met with Juan Miguel and Linh SARABIA for daily GIP assessment and rounds. Patient is non-responsive to touch or voice.  No s/sx of pain per rFLACC scale. Patient had moderate amount secretions on pillow. Robinul 0.2 mg PRN was given x 2. Patient received cares and sheets changed. Bladder was distended with palpation and bladder scan showed 704 mL. Patient was pre-medicated for dhillon placement with Morphine IVP and Ativan. Dhillon catheter was placed with lidocaine for comfort and patient tolerated tx well. Immediate urine return of 800 mL of dark geno urine. Body temperature is fluctuating. No s/sx of dyspnea. Recommended pre-medicating with T/R. Met with family and questions answered regarding EOL.     I would like to thank all the staff for the exceptional care  provided for Juan Miguel and the end of life support for his family.      Debra Behselich, RN CNL  [facility]

## 2023-09-16 NOTE — DEATH PRONOUNCEMENT
MD DEATH PRONOUNCEMENT    Called to pronounce Juan Miguel Hawkins dead.    Physical Exam: Unresponsive to noxious stimuli, Spontaneous respirations absent, Breath sounds absent, Carotid pulse absent, Heart sounds absent, Pupillary light reflex absent, and Corneal blink reflex absent.     Patient was pronounced dead at 04:55 AM, 2023.    Preliminary Cause of Death: Acute ischemic stroke secondary to M1 occlusion     Principal Problem:    Hospice care  Active Problems:    Hospice care patient       Infectious disease present?: NO    Communicable disease present? (examples: HIV, chicken pox, TB, Ebola, CJD) :  NO    Multi-drug resistant organism present? (example: MRSA): NO    Please consider an autopsy if any of the following exist:  NO Unexpected or unexplained death during or following any dental, medical, or surgical diagnostic treatment procedures.   NO Death of mother at or up to seven days after delivery.     NO All  and pediatric deaths.     NO Death where the cause is sufficiently obscure to delay completion of the death certificate.   NO Deaths in which autopsy would confirm a suspected illness/condition that would affect surviving family members or recipients of transplanted organs.     The following deaths must be reported to the 's Office:  NO A death that may be due entirely or in part to any factors other than natural disease (recent surgery, recent trauma, suspected abuse/neglect).   NO A death that may be an accident, suicide, or homicide.     NO Any sudden, unexpected death in which there is no prior history of significant heart disease or any other condition associated with sudden death.   NO A death under suspicious, unusual, or unexpected circumstances.    NO Any death which is apparently due to natural causes but in which the  does not have a personal physician familiar with the patient s medical history, social, or environmental situation or the circumstances of  the terminal event.   NO Any death apparently due to Sudden Infant Death Syndrome.     NO Deaths that occur during, in association with, or as consequences of a diagnostic, therapeutic, or anesthetic procedure.   NO Any death in which a fracture of a major bone has occurred within the past (6) six months.   NO A death of persons note seen by their physician within 120 days of demise.     NO Any death in which the  was an inmate of a public institution or was in the custody of Law Enforcement personnel.   NO  All unexpected deaths of children   NO Solid organ donors   NO Unidentified bodies   unknown Deaths of persons whose bodies are to be cremated or otherwise disposed of so that the bodies will later be unavailable for examination;   NO Deaths unattended by a physician outside of a licensed healthcare facility or licensed residential hospice program   NO Deaths occurring within 24 hours of arrival to a health care facility if death is unexpected.    NO Deaths associated with the decedent s employment.   NO Deaths attributed to acts of terrorism.   NO Any death in which there is uncertainty as to whether it is a medical examiner s care should be discussed with the medical investigator.        Body disposition: Autopsy was discussed with family member:  daughter in law in person.  Permission for autopsy was declined (on behalf of son Edgar). Body released to the morgue.    Please route death certificate to primary rounding Hospitalist Dr. Cavazos.     Jonna Seth NP  [facility]  Securely message with the Vocera Web Console (learn more here)  Text page via [employer] Paging/Directory

## 2023-09-16 NOTE — DISCHARGE SUMMARY
Discharge Summary  Hospitalist    Date of Admission:  9/14/2023  Date of Discharge:  9/16/2023  8:47 AM  Discharging Provider: Shannan Cavazos MD    Primary Care Physician   Physician No Ref-Primary  Primary Care Provider Phone Number: None  Primary Care Provider Fax Number: [phone]    PRIMARY DIAGNOSIS  Comfort care patient.  Acute ischemic stroke secondary to M1 occlusion.     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  Hyperlipidemia.  Benign prostate hyperplasia  Insomnia  Major depression.  Personal history of colon cancer.  Prostate cancer.  Chronic thrombocytopenia.  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       History of Present Illness   Juan Miguel Hawkins is an 89 year old male who presented with right side weakness.    Hospital Course   Juan Miguel Hawkins is a 89 year old male with hypertension, hyperlipidemia, mild depression, prostate cancer, chronic thrombocytopenia transferred to [facility] to undergo emergent thrombectomy after patient presented with acute ischemic stroke resulting from left M1 occlusion.     PRIMARY DIAGNOSIS  Comfort care patient.  Acute ischemic stroke secondary to M1 occlusion.     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  Hyperlipidemia.  Benign prostate hyperplasia  Insomnia  Major depression.  Personal history of colon cancer.  Prostate cancer.  Chronic thrombocytopenia.  History of TIA  History of falls at home    Hospital course.   --Patient presented with acute onset right-sided weakness, dysarthria.  PTA on Xarelto due to history of DVT. CT head and neck with left M1 occlusion.  Per stroke neurology patient not a candidate for TNK as patient  was on DOAC agent. Considered to be a candidate for endovascular treatment for left M1 occlusion.    Transferred to [facility] endovascular intervention. Followed by neuro IR, endovascular treatment aborted after patient's family opted for comfort care on 9/12 respecting patient's wishes.   Patient discharged, readmitted to inpatient hospice on 9/14.      Patient was pronounced dead at 04:55 AM, September 16, 2023 by Jonna Seth NP ( [employer])     Shannan Cavazos MD.

## (undated) RX ORDER — LIDOCAINE HYDROCHLORIDE 10 MG/ML
INJECTION, SOLUTION INFILTRATION; PERINEURAL
Status: DISPENSED
Start: 2023-01-01

## (undated) RX ORDER — HEPARIN SODIUM 200 [USP'U]/100ML
INJECTION, SOLUTION INTRAVENOUS
Status: DISPENSED
Start: 2023-01-01

## (undated) RX ORDER — FENTANYL CITRATE 50 UG/ML
INJECTION, SOLUTION INTRAMUSCULAR; INTRAVENOUS
Status: DISPENSED
Start: 2023-01-01